# Patient Record
Sex: FEMALE | Race: WHITE | NOT HISPANIC OR LATINO | Employment: STUDENT | URBAN - NONMETROPOLITAN AREA
[De-identification: names, ages, dates, MRNs, and addresses within clinical notes are randomized per-mention and may not be internally consistent; named-entity substitution may affect disease eponyms.]

---

## 2024-09-03 ENCOUNTER — OFFICE VISIT (OUTPATIENT)
Dept: INTERNAL MEDICINE | Facility: CLINIC | Age: 19
End: 2024-09-03
Payer: COMMERCIAL

## 2024-09-03 VITALS
HEART RATE: 80 BPM | BODY MASS INDEX: 35.05 KG/M2 | HEIGHT: 70 IN | SYSTOLIC BLOOD PRESSURE: 137 MMHG | WEIGHT: 244.8 LBS | TEMPERATURE: 98.4 F | DIASTOLIC BLOOD PRESSURE: 81 MMHG | OXYGEN SATURATION: 99 %

## 2024-09-03 DIAGNOSIS — Z11.59 ENCOUNTER FOR HEPATITIS C SCREENING TEST FOR LOW RISK PATIENT: ICD-10-CM

## 2024-09-03 DIAGNOSIS — Z86.69 HISTORY OF TETHERED SPINAL CORD: ICD-10-CM

## 2024-09-03 DIAGNOSIS — Z00.00 ANNUAL PHYSICAL EXAM: ICD-10-CM

## 2024-09-03 DIAGNOSIS — E06.3 HYPOTHYROIDISM DUE TO HASHIMOTO'S THYROIDITIS: ICD-10-CM

## 2024-09-03 DIAGNOSIS — E03.8 HYPOTHYROIDISM DUE TO HASHIMOTO'S THYROIDITIS: ICD-10-CM

## 2024-09-03 DIAGNOSIS — G43.E19 INTRACTABLE CHRONIC MIGRAINE WITH AURA AND WITHOUT STATUS MIGRAINOSUS: ICD-10-CM

## 2024-09-03 DIAGNOSIS — N39.46 MIXED STRESS AND URGE URINARY INCONTINENCE: ICD-10-CM

## 2024-09-03 DIAGNOSIS — G91.9 HYDROCEPHALUS, UNSPECIFIED TYPE: ICD-10-CM

## 2024-09-03 DIAGNOSIS — Z87.74 HISTORY OF TETRALOGY OF FALLOT: Primary | ICD-10-CM

## 2024-09-03 PROCEDURE — 99385 PREV VISIT NEW AGE 18-39: CPT | Performed by: FAMILY MEDICINE

## 2024-09-03 RX ORDER — ATOGEPANT 10 MG/1
10 TABLET ORAL DAILY
Qty: 30 TABLET | Refills: 11 | Status: SHIPPED | OUTPATIENT
Start: 2024-09-03

## 2024-09-03 RX ORDER — CYCLOBENZAPRINE HCL 10 MG
10 TABLET ORAL 3 TIMES DAILY PRN
Qty: 90 TABLET | Refills: 11 | Status: SHIPPED | OUTPATIENT
Start: 2024-09-03

## 2024-09-03 RX ORDER — BACLOFEN 10 MG/1
10 TABLET ORAL 3 TIMES DAILY
COMMUNITY
End: 2024-09-03

## 2024-09-03 RX ORDER — TOPIRAMATE 50 MG/1
50 TABLET, FILM COATED ORAL 2 TIMES DAILY
Qty: 60 TABLET | Refills: 11 | Status: SHIPPED | OUTPATIENT
Start: 2024-09-03

## 2024-09-03 RX ORDER — DULOXETIN HYDROCHLORIDE 30 MG/1
30 CAPSULE, DELAYED RELEASE ORAL DAILY
COMMUNITY
Start: 2024-08-18

## 2024-09-03 RX ORDER — PRUCALOPRIDE 2 MG/1
1 TABLET, FILM COATED ORAL DAILY
COMMUNITY
Start: 2024-07-28 | End: 2024-09-03 | Stop reason: SDUPTHER

## 2024-09-03 RX ORDER — IBUPROFEN 600 MG/1
600 TABLET, FILM COATED ORAL EVERY 6 HOURS PRN
COMMUNITY
Start: 2024-07-02 | End: 2024-09-03 | Stop reason: SDUPTHER

## 2024-09-03 RX ORDER — MULTIVIT-MIN/IRON/FOLIC ACID/K 18-600-40
CAPSULE ORAL DAILY
COMMUNITY

## 2024-09-03 RX ORDER — PRUCALOPRIDE 2 MG/1
1 TABLET, FILM COATED ORAL DAILY
Qty: 30 TABLET | Refills: 11 | Status: SHIPPED | OUTPATIENT
Start: 2024-09-03

## 2024-09-03 RX ORDER — OXYBUTYNIN CHLORIDE 10 MG/1
10 TABLET, EXTENDED RELEASE ORAL DAILY
Qty: 30 TABLET | Refills: 11 | Status: SHIPPED | OUTPATIENT
Start: 2024-09-03

## 2024-09-03 RX ORDER — NORETHINDRONE ACETATE AND ETHINYL ESTRADIOL 1MG-20(21)
1 KIT ORAL DAILY
COMMUNITY

## 2024-09-03 RX ORDER — OXYBUTYNIN CHLORIDE 10 MG/1
1 TABLET, EXTENDED RELEASE ORAL DAILY
COMMUNITY
End: 2024-09-03 | Stop reason: SDUPTHER

## 2024-09-03 RX ORDER — IBUPROFEN 800 MG/1
800 TABLET, FILM COATED ORAL EVERY 6 HOURS PRN
Qty: 90 TABLET | Refills: 3 | Status: SHIPPED | OUTPATIENT
Start: 2024-09-03

## 2024-09-03 RX ORDER — TOPIRAMATE 50 MG/1
50 TABLET, FILM COATED ORAL 2 TIMES DAILY
COMMUNITY
End: 2024-09-03 | Stop reason: SDUPTHER

## 2024-09-03 NOTE — PROGRESS NOTES
Subjective     Chief Complaint   Patient presents with    Headache     Establish       History of Present Illness    Patient's PMR from outside medical facility reviewed and noted.    Ellen Gusman is a 19 y.o. female who presents to establish a new Primary care physician.  she presents for evaluation of headache. Symptoms began about several  years  ago. Generally, the headaches last about several days and occur continuously. The headaches do not seem to be related to any time of the day. The headaches are usually moderate, dull, pounding, sharp, squeezing, and throbbing and are located in frontal and occipital.  The patient rates her most severe headaches a 7 on a scale from 1 to 10. Recently, the headaches have been increasing in frequency. Work attendance or other daily activities are affected by the headaches. Precipitating factors include: weather changes. The headaches are usually preceded by an aura consisting of numbness in cheek . Associated neurologic symptoms: worsening school/work performance. The patient denies dizziness, loss of balance, and muscle weakness. Home treatment has included  amovig, ajovy, topomax  with some improvement. Other history includes: nothing pertinent. Family history includes no known family members with significant headaches.  Start the patient on some Qulipta and see if this is effective for this patient she would also like to get a referral to neurology.  Patient had prior primarily been diagnosed with hydrocephalus however they had decided not to do surgery as her pressures were lower when she got on the operating table to have it done she would like to have this rechecked by neurology at this time.    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  Patient has tried and failed parastine treatments, but has not been on other medication due to a prior bowl perforation.  Discussed with the patient that we may need  to try some alternatives and she understands this going forward    Due to prior history of tethered spinal cord patient also has some urinary incontinence she would like to go ahead and get set up with urology at this time.  She states that she does do very well with her current medication but has been advised that she may need to cath in the future    Patient also states that due to her history of tetralogy of Fallot that she would like to get set up with a cardiologist to follow.  She states that she does fine with her current medications and did have a workup before she left [place] that came back looking fairly good for her.    Has a history of Hashimoto's and would like to recheck her thyroid levels as well as her routine labs at this time.  Would also like to get her hepatitis C screening done today.    I discussed at preventative health care and cancer screening with her and its role in reducing types of cancer and other health problems appropriate for the patient's age.  Also discussed vaccines and current status with the patient.  Patient understands the risks and benefits of screening and is currently up-to-date with current recommendations that she chosses.        Past Medical History:   Past Medical History:   Diagnosis Date    GERD (gastroesophageal reflux disease)     Headache     Heart murmur     History of medical problems     CHD (tetralogy of Fallot), Tether cord, Rich-Danlos, Dysautonomia    Low back pain      Past Surgical History:  Past Surgical History:   Procedure Laterality Date    CARDIAC CATHETERIZATION  2013    CARDIAC SURGERY  2004,2016    Trilogy of Fallot  repair and pulmonary valve replacement    CARDIAC VALVE REPLACEMENT      COLONOSCOPY  2014    Colonoscopy That led to Bowel perforation    SPINE SURGERY  2019,2019    The cord release and revision     Social History:  reports that she has never smoked. She has never used smokeless tobacco. She reports that she does not drink alcohol  and does not use drugs.    Family History: family history includes Anxiety disorder in her mother and sister; Cancer in her maternal grandmother; Depression in her paternal grandfather; Hyperlipidemia in her father; Mental illness in her paternal grandfather; Other in her mother; Thyroid disease in her mother; Vision loss in her mother.      Allergies:  Allergies   Allergen Reactions    Sulfamethoxazole-Trimethoprim Anaphylaxis    Gluten Meal Other (See Comments)     Abd pain, chest pain      Medications:  Prior to Admission medications    Medication Sig Start Date End Date Taking? Authorizing Provider   Ascorbic Acid (Vitamin C) 500 MG capsule Daily.   Yes Bert Mcknight MD   Aspirin 81 MG capsule Take 81 mg by mouth Daily.   Yes Bert Mcknight MD   aspirin-acetaminophen-caffeine (EXCEDRIN MIGRAINE) 250-250-65 MG per tablet Take 1 tablet by mouth Every 6 (Six) Hours As Needed.   Yes Bert Mcknight MD   baclofen (LIORESAL) 10 MG tablet Take 1 tablet by mouth 3 (Three) Times a Day.   Yes Bert Mcknight MD   Blisovi FE 1/20 1-20 MG-MCG per tablet Take 1 tablet by mouth Daily.   Yes Bert Mcknight MD   Cholecalciferol (D3-1000 PO) Daily.   Yes Bert Mcknight MD   DULoxetine (CYMBALTA) 30 MG capsule Take 1 capsule by mouth Daily. 8/18/24  Yes Bert Mcknight MD   ibuprofen (ADVIL,MOTRIN) 600 MG tablet Take 1 tablet by mouth Every 6 (Six) Hours As Needed. for pain 7/2/24  Yes Bert Mcknight MD   Motegrity 2 MG tablet Take 1 tablet by mouth Daily. 7/28/24  Yes Bert Mcknight MD   oxybutynin XL (DITROPAN-XL) 10 MG 24 hr tablet Take 1 tablet by mouth Daily.   Yes Bret Mcknight MD   topiramate (TOPAMAX) 50 MG tablet Take 1 tablet by mouth 2 (Two) Times a Day.   Yes Bert Mcknight MD       JAIMEE: Over the last two weeks, how often have you been bothered by the following problems?  Feeling nervous, anxious or on edge: Not at all  Not being able to stop  "or control worrying: Not at all  Worrying too much about different things: Not at all  Trouble Relaxing: Not at all  Being so restless that it is hard to sit still: Not at all  Becoming easily annoyed or irritable: Not at all  Feeling afraid as if something awful might happen: Not at all  JAIMEE 7 Total Score: 0  If you checked any problems, how difficult have these problems made it for you to do your work, take care of things at home, or get along with other people: Not difficult at all      PHQ-9 Depression Screening  Little interest or pleasure in doing things? 0-->not at all   Feeling down, depressed, or hopeless? 0-->not at all   Trouble falling or staying asleep, or sleeping too much?     Feeling tired or having little energy?     Poor appetite or overeating?     Feeling bad about yourself - or that you are a failure or have let yourself or your family down?     Trouble concentrating on things, such as reading the newspaper or watching television?     Moving or speaking so slowly that other people could have noticed? Or the opposite - being so fidgety or restless that you have been moving around a lot more than usual?     Thoughts that you would be better off dead, or of hurting yourself in some way?     PHQ-9 Total Score 0   If you checked off any problems, how difficult have these problems made it for you to do your work, take care of things at home, or get along with other people?         PHQ-9 Total Score: 0   0 (Negative screening for depression)  Support given, observe for worsening symptoms    Review of systems   negative unless otherwise specified above in HPI    Objective     Vital Signs: /81 (BP Location: Left arm, Patient Position: Sitting, Cuff Size: Adult)   Pulse 80   Temp 98.4 °F (36.9 °C) (Infrared)   Ht 177.8 cm (70\")   Wt 111 kg (244 lb 12.8 oz)   SpO2 99%   BMI 35.13 kg/m²     Physical Exam  Vitals and nursing note reviewed.   Constitutional:       General: She is not in acute " "distress.     Appearance: Normal appearance.   HENT:      Head: Normocephalic.   Eyes:      Extraocular Movements: Extraocular movements intact.      Pupils: Pupils are equal, round, and reactive to light.   Cardiovascular:      Rate and Rhythm: Normal rate and regular rhythm.      Heart sounds: Normal heart sounds. No murmur heard.  Pulmonary:      Effort: Pulmonary effort is normal. No respiratory distress.      Breath sounds: Normal breath sounds. No rhonchi or rales.   Abdominal:      General: Abdomen is flat. Bowel sounds are normal.      Palpations: Abdomen is soft.   Neurological:      General: No focal deficit present.      Mental Status: She is alert.         Class 2 Severe Obesity (BMI >=35 and <=39.9). Obesity-related health conditions include the following: hypertension. Obesity is newly identified. BMI is is above average; BMI management plan is completed. We discussed portion control and increasing exercise.      Results Reviewed:  No results found for: \"GLUCOSE\", \"BUN\", \"CREATININE\", \"NA\", \"K\", \"CL\", \"CO2\", \"CALCIUM\", \"ALT\", \"AST\", \"WBC\", \"HCT\", \"PLT\", \"CHOL\", \"TRIG\", \"HDL\", \"LDL\", \"LDLHDL\", \"HGBA1C\"          Procedure   Procedures       Assessment / Plan     Assessment/Plan:   Diagnosis Plan   1. History of tetralogy of Fallot  Ambulatory Referral to Cardiology      2. Hydrocephalus, unspecified type  Ambulatory Referral to Neurology      3. Intractable chronic migraine with aura and without status migrainosus  Atogepant (Qulipta) 10 MG tablet    cyclobenzaprine (FLEXERIL) 10 MG tablet    ibuprofen (ADVIL,MOTRIN) 800 MG tablet    topiramate (TOPAMAX) 50 MG tablet      4. History of tethered spinal cord  Motegrity 2 MG tablet    oxybutynin XL (DITROPAN-XL) 10 MG 24 hr tablet      5. Encounter for hepatitis C screening test for low risk patient  Hepatitis C Antibody      6. Annual physical exam  CBC & Differential    Comprehensive Metabolic Panel    Lipid Panel      7. Mixed stress and urge urinary " incontinence  oxybutynin XL (DITROPAN-XL) 10 MG 24 hr tablet    Ambulatory Referral to Urology      8. Hypothyroidism due to Hashimoto's thyroiditis  TSH            Return in about 4 weeks (around 10/1/2024) for Recheck. unless patient needs to be seen sooner or acute issues arise.      I have discussed the patient results/orders and and plan/recommendation with them at today's visit.      Signed by:    Keon Johns MD Date: 09/03/24

## 2024-09-04 ENCOUNTER — TELEPHONE (OUTPATIENT)
Dept: INTERNAL MEDICINE | Facility: CLINIC | Age: 19
End: 2024-09-04
Payer: COMMERCIAL

## 2024-09-04 DIAGNOSIS — K59.2 CONSTIPATION DUE TO NEUROGENIC BOWEL: Primary | ICD-10-CM

## 2024-09-04 DIAGNOSIS — K59.00 CONSTIPATION DUE TO NEUROGENIC BOWEL: Primary | ICD-10-CM

## 2024-09-04 RX ORDER — LUBIPROSTONE 24 UG/1
24 CAPSULE ORAL 2 TIMES DAILY WITH MEALS
Qty: 60 CAPSULE | Refills: 11 | Status: SHIPPED | OUTPATIENT
Start: 2024-09-04

## 2024-09-04 NOTE — TELEPHONE ENCOUNTER
Sent Amitiza as an alternative patient was advised of this possibility while in the office let her know of the change in that we may be go back to the Motegrity in the future

## 2024-09-05 LAB
ALBUMIN SERPL-MCNC: 4.9 G/DL (ref 4–5)
ALP SERPL-CCNC: 81 IU/L (ref 42–106)
ALT SERPL-CCNC: 24 IU/L (ref 0–32)
AST SERPL-CCNC: 18 IU/L (ref 0–40)
BASOPHILS # BLD AUTO: 0.1 X10E3/UL (ref 0–0.2)
BASOPHILS NFR BLD AUTO: 1 %
BILIRUB SERPL-MCNC: 0.2 MG/DL (ref 0–1.2)
BUN SERPL-MCNC: 12 MG/DL (ref 6–20)
BUN/CREAT SERPL: 14 (ref 9–23)
CALCIUM SERPL-MCNC: 10.1 MG/DL (ref 8.7–10.2)
CHLORIDE SERPL-SCNC: 103 MMOL/L (ref 96–106)
CHOLEST SERPL-MCNC: 245 MG/DL (ref 100–199)
CO2 SERPL-SCNC: 18 MMOL/L (ref 20–29)
CREAT SERPL-MCNC: 0.83 MG/DL (ref 0.57–1)
EGFRCR SERPLBLD CKD-EPI 2021: 103 ML/MIN/1.73
EOSINOPHIL # BLD AUTO: 0.1 X10E3/UL (ref 0–0.4)
EOSINOPHIL NFR BLD AUTO: 1 %
ERYTHROCYTE [DISTWIDTH] IN BLOOD BY AUTOMATED COUNT: 12.2 % (ref 11.7–15.4)
GLOBULIN SER CALC-MCNC: 2.9 G/DL (ref 1.5–4.5)
GLUCOSE SERPL-MCNC: 84 MG/DL (ref 70–99)
HCT VFR BLD AUTO: 46 % (ref 34–46.6)
HCV IGG SERPL QL IA: NON REACTIVE
HDLC SERPL-MCNC: 49 MG/DL
HGB BLD-MCNC: 14.6 G/DL (ref 11.1–15.9)
IMM GRANULOCYTES # BLD AUTO: 0 X10E3/UL (ref 0–0.1)
IMM GRANULOCYTES NFR BLD AUTO: 0 %
LDLC SERPL CALC-MCNC: 167 MG/DL (ref 0–99)
LYMPHOCYTES # BLD AUTO: 1.7 X10E3/UL (ref 0.7–3.1)
LYMPHOCYTES NFR BLD AUTO: 28 %
MCH RBC QN AUTO: 29.1 PG (ref 26.6–33)
MCHC RBC AUTO-ENTMCNC: 31.7 G/DL (ref 31.5–35.7)
MCV RBC AUTO: 92 FL (ref 79–97)
MONOCYTES # BLD AUTO: 0.4 X10E3/UL (ref 0.1–0.9)
MONOCYTES NFR BLD AUTO: 6 %
NEUTROPHILS # BLD AUTO: 4 X10E3/UL (ref 1.4–7)
NEUTROPHILS NFR BLD AUTO: 64 %
PLATELET # BLD AUTO: 260 X10E3/UL (ref 150–450)
POTASSIUM SERPL-SCNC: 4.3 MMOL/L (ref 3.5–5.2)
PROT SERPL-MCNC: 7.8 G/DL (ref 6–8.5)
RBC # BLD AUTO: 5.02 X10E6/UL (ref 3.77–5.28)
SODIUM SERPL-SCNC: 136 MMOL/L (ref 134–144)
TRIGL SERPL-MCNC: 161 MG/DL (ref 0–149)
TSH SERPL DL<=0.005 MIU/L-ACNC: 0.96 UIU/ML (ref 0.45–4.5)
VLDLC SERPL CALC-MCNC: 29 MG/DL (ref 5–40)
WBC # BLD AUTO: 6.3 X10E3/UL (ref 3.4–10.8)

## 2024-09-25 ENCOUNTER — OFFICE VISIT (OUTPATIENT)
Dept: CARDIOLOGY | Facility: CLINIC | Age: 19
End: 2024-09-25
Payer: COMMERCIAL

## 2024-09-25 VITALS
WEIGHT: 244 LBS | SYSTOLIC BLOOD PRESSURE: 118 MMHG | HEIGHT: 70 IN | BODY MASS INDEX: 34.93 KG/M2 | DIASTOLIC BLOOD PRESSURE: 81 MMHG | HEART RATE: 88 BPM

## 2024-09-25 DIAGNOSIS — Z87.74 HISTORY OF TETRALOGY OF FALLOT: ICD-10-CM

## 2024-09-25 DIAGNOSIS — R42 DIZZINESS: ICD-10-CM

## 2024-09-25 DIAGNOSIS — R07.89 OTHER CHEST PAIN: ICD-10-CM

## 2024-09-25 DIAGNOSIS — Q21.3 TETRALOGY OF FALLOT: Primary | ICD-10-CM

## 2024-09-25 PROCEDURE — 99204 OFFICE O/P NEW MOD 45 MIN: CPT | Performed by: EMERGENCY MEDICINE

## 2024-09-27 ENCOUNTER — OFFICE VISIT (OUTPATIENT)
Dept: UROLOGY | Facility: CLINIC | Age: 19
End: 2024-09-27
Payer: COMMERCIAL

## 2024-09-27 VITALS — BODY MASS INDEX: 35.1 KG/M2 | HEIGHT: 70 IN | WEIGHT: 245.2 LBS | TEMPERATURE: 97.6 F

## 2024-09-27 DIAGNOSIS — N39.46 MIXED STRESS AND URGE URINARY INCONTINENCE: Primary | ICD-10-CM

## 2024-09-27 LAB
BILIRUB BLD-MCNC: ABNORMAL MG/DL
CLARITY, POC: CLEAR
COLOR UR: YELLOW
GLUCOSE UR STRIP-MCNC: NEGATIVE MG/DL
KETONES UR QL: NEGATIVE
LEUKOCYTE EST, POC: ABNORMAL
NITRITE UR-MCNC: NEGATIVE MG/ML
PH UR: 5.5 [PH] (ref 5–8)
PROT UR STRIP-MCNC: ABNORMAL MG/DL
RBC # UR STRIP: NEGATIVE /UL
SP GR UR: 1.03 (ref 1–1.03)
UROBILINOGEN UR QL: ABNORMAL

## 2024-09-27 RX ORDER — TOLTERODINE 4 MG/1
4 CAPSULE, EXTENDED RELEASE ORAL DAILY
Qty: 30 CAPSULE | Refills: 11 | Status: SHIPPED | OUTPATIENT
Start: 2024-09-27

## 2024-09-29 PROBLEM — R07.89 OTHER CHEST PAIN: Status: ACTIVE | Noted: 2024-09-29

## 2024-09-29 PROBLEM — R42 DIZZINESS: Status: ACTIVE | Noted: 2024-09-29

## 2024-10-07 ENCOUNTER — OFFICE VISIT (OUTPATIENT)
Dept: INTERNAL MEDICINE | Facility: CLINIC | Age: 19
End: 2024-10-07
Payer: COMMERCIAL

## 2024-10-07 VITALS
WEIGHT: 244 LBS | HEART RATE: 83 BPM | OXYGEN SATURATION: 98 % | BODY MASS INDEX: 34.93 KG/M2 | HEIGHT: 70 IN | DIASTOLIC BLOOD PRESSURE: 82 MMHG | TEMPERATURE: 98.5 F | SYSTOLIC BLOOD PRESSURE: 126 MMHG

## 2024-10-07 DIAGNOSIS — Z23 NEED FOR INFLUENZA VACCINATION: ICD-10-CM

## 2024-10-07 DIAGNOSIS — K59.00 CONSTIPATION DUE TO NEUROGENIC BOWEL: ICD-10-CM

## 2024-10-07 DIAGNOSIS — K59.2 CONSTIPATION DUE TO NEUROGENIC BOWEL: ICD-10-CM

## 2024-10-07 DIAGNOSIS — Z86.69 HISTORY OF TETHERED SPINAL CORD: ICD-10-CM

## 2024-10-07 DIAGNOSIS — E78.2 MIXED HYPERLIPIDEMIA: ICD-10-CM

## 2024-10-07 DIAGNOSIS — R13.10 DIFFICULTY SWALLOWING SOLIDS: Primary | ICD-10-CM

## 2024-10-07 PROBLEM — K92.89 GASTROINTESTINAL DYSMOTILITY: Status: ACTIVE | Noted: 2021-05-24

## 2024-10-07 PROBLEM — R42 DIZZINESS: Status: RESOLVED | Noted: 2024-09-29 | Resolved: 2024-10-07

## 2024-10-07 PROBLEM — G93.2 IIH (IDIOPATHIC INTRACRANIAL HYPERTENSION): Chronic | Status: ACTIVE | Noted: 2022-04-28

## 2024-10-07 PROBLEM — Z87.74: Status: ACTIVE | Noted: 2023-12-24

## 2024-10-07 PROBLEM — G47.33 OSA (OBSTRUCTIVE SLEEP APNEA): Status: ACTIVE | Noted: 2024-04-24

## 2024-10-07 PROBLEM — I77.4 CELIAC ARTERY STENOSIS: Status: ACTIVE | Noted: 2018-06-25

## 2024-10-07 PROBLEM — K21.9 GERD (GASTROESOPHAGEAL REFLUX DISEASE): Status: ACTIVE | Noted: 2018-06-25

## 2024-10-07 PROBLEM — E66.9 OBESITY (BMI 30-39.9): Chronic | Status: ACTIVE | Noted: 2022-04-28

## 2024-10-07 PROBLEM — R60.0 LOWER EXTREMITY EDEMA: Status: ACTIVE | Noted: 2018-06-25

## 2024-10-07 PROBLEM — Z95.2: Status: ACTIVE | Noted: 2023-12-24

## 2024-10-07 PROBLEM — J45.909 ASTHMA: Status: ACTIVE | Noted: 2018-06-25

## 2024-10-07 PROBLEM — Q79.60 EHLERS-DANLOS SYNDROME: Status: ACTIVE | Noted: 2022-02-10

## 2024-10-07 PROBLEM — M79.2 PERIPHERAL NEUROPATHIC PAIN: Status: ACTIVE | Noted: 2018-11-20

## 2024-10-07 PROBLEM — Q06.8 OTHER SPECIFIED CONGENITAL MALFORMATIONS OF SPINAL CORD: Status: ACTIVE | Noted: 2019-01-12

## 2024-10-07 PROBLEM — D89.40 MAST CELL ACTIVATION SYNDROME: Status: ACTIVE | Noted: 2020-05-12

## 2024-10-07 PROBLEM — L85.8 KERATOSIS PILARIS: Status: ACTIVE | Noted: 2018-06-25

## 2024-10-07 PROBLEM — Q21.12 PFO (PATENT FORAMEN OVALE): Status: ACTIVE | Noted: 2023-12-25

## 2024-10-07 PROBLEM — F41.9 ANXIETY AND DEPRESSION: Chronic | Status: ACTIVE | Noted: 2022-04-28

## 2024-10-07 PROBLEM — F32.A ANXIETY AND DEPRESSION: Chronic | Status: ACTIVE | Noted: 2022-04-28

## 2024-10-07 PROCEDURE — 90656 IIV3 VACC NO PRSV 0.5 ML IM: CPT | Performed by: FAMILY MEDICINE

## 2024-10-07 PROCEDURE — 90471 IMMUNIZATION ADMIN: CPT | Performed by: FAMILY MEDICINE

## 2024-10-07 PROCEDURE — 99214 OFFICE O/P EST MOD 30 MIN: CPT | Performed by: FAMILY MEDICINE

## 2024-10-07 RX ORDER — ROSUVASTATIN CALCIUM 10 MG/1
10 TABLET, COATED ORAL DAILY
Qty: 30 TABLET | Refills: 11 | Status: SHIPPED | OUTPATIENT
Start: 2024-10-07

## 2024-10-07 NOTE — PROGRESS NOTES
Subjective     Chief Complaint   Patient presents with   • Headache       Extremity Pain       Patient's PMR from outside medical facility reviewed and noted.    Ellen Gusman is a 19 y.o. female who presents for a routine office visit.  Patient comes in stating that she is having difficulty swallowing solids on a regular basis.  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  Patient also did not do well with the Amitiza instead of her Motegrity but will follow-up with GI over this and she will be following with them.    Patient also states that she did not do well on Flexeril she states that she felt heart palpitations when she took it would like to try different muscle relaxer at this time.    Patient just started her Qulipta she is not sure how well it is working for her yet.    I saw her last she has followed up with cardiology and will be obtaining a stress test she will develop also be following up with a specialist at [facility] for her congenital coronary artery disease.  Patient has also seen pulmonology since we saw her last.    Discussed the patient's mixed hyperlipidemia due to her history of coronary artery disease we will go ahead and start her on some Crestor along with diet follow-up with her in 4 months and recheck her labs.    Patient would also like to go ahead and get her flu shot today.        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   • Hyperlipidemia     Working on it   • Low back pain    • Sleep apnea 2023    Use Cpap   • Urinary tract infection [facility]    Missed Tethered Cord until age 13     Past Surgical History:  Past Surgical History:   Procedure Laterality Date   • CARDIAC CATHETERIZATION  2013   • CARDIAC SURGERY  2004,2016    Trilogy of Fallot  repair and pulmonary valve replacement   • CARDIAC VALVE REPLACEMENT     • COLON SURGERY  2013    Perforation during testing   • COLONOSCOPY  2014    Colonoscopy That led to Bowel perforation   • CORONARY ARTERY BYPASS GRAFT  2004,2016    Trilogy of Fallot   • SPINE SURGERY  2019,2019    The cord release and revision     Social History:  reports that she has never smoked. She has never been exposed to tobacco smoke. She has never used smokeless tobacco. She reports that she does not drink alcohol and does not use drugs.    Family History: family history includes Anxiety disorder in her mother and sister; Cancer in her maternal grandmother; Depression in her paternal grandfather; Hyperlipidemia in her father; Mental illness in her paternal grandfather; Other in her mother; Thyroid disease in her mother; Vision loss in her mother.      Allergies:  Allergies   Allergen Reactions   • Sulfamethoxazole-Trimethoprim Anaphylaxis   • Gluten Meal Other (See Comments)     Abd pain, chest pain      Medications:  Prior to Admission medications    Medication Sig Start Date End Date Taking? Authorizing Provider   Ascorbic Acid (Vitamin C) 500 MG capsule Daily.   Yes Bert Mcknight MD   Aspirin 81 MG capsule Take 81 mg by mouth Daily.   Yes Bert Mcknight MD   aspirin-acetaminophen-caffeine (EXCEDRIN MIGRAINE) 250-250-65 MG per tablet Take 1 tablet by mouth Every 6 (Six) Hours As Needed.   Yes Bert Mcknight MD    baclofen (LIORESAL) 10 MG tablet Take 1 tablet by mouth 3 (Three) Times a Day.   Yes Bert Mcknight MD   Blisovi FE 1/20 1-20 MG-MCG per tablet Take 1 tablet by mouth Daily.   Yes Bert Mcknight MD   Cholecalciferol (D3-1000 PO) Daily.   Yes Bert Mcknight MD   DULoxetine (CYMBALTA) 30 MG capsule Take 1 capsule by mouth Daily. 8/18/24  Yes Bert Mcknight MD   ibuprofen (ADVIL,MOTRIN) 600 MG tablet Take 1 tablet by mouth Every 6 (Six) Hours As Needed. for pain 7/2/24  Yes Bert Mcknight MD   Motegrity 2 MG tablet Take 1 tablet by mouth Daily. 7/28/24  Yes Bert Mcknight MD   oxybutynin XL (DITROPAN-XL) 10 MG 24 hr tablet Take 1 tablet by mouth Daily.   Yes Bert Mcknight MD   topiramate (TOPAMAX) 50 MG tablet Take 1 tablet by mouth 2 (Two) Times a Day.   Yes Bert Mcknight MD       JAIMEE:        PHQ-9 Depression Screening  Little interest or pleasure in doing things? 0-->not at all   Feeling down, depressed, or hopeless? 0-->not at all   Trouble falling or staying asleep, or sleeping too much?     Feeling tired or having little energy?     Poor appetite or overeating?     Feeling bad about yourself - or that you are a failure or have let yourself or your family down?     Trouble concentrating on things, such as reading the newspaper or watching television?     Moving or speaking so slowly that other people could have noticed? Or the opposite - being so fidgety or restless that you have been moving around a lot more than usual?     Thoughts that you would be better off dead, or of hurting yourself in some way?     PHQ-9 Total Score 0   If you checked off any problems, how difficult have these problems made it for you to do your work, take care of things at home, or get along with other people?         PHQ-9 Total Score: 0   0 (Negative screening for depression)  Support given, observe for worsening symptoms    Review of systems   negative unless otherwise  "specified above in HPI    Objective     Vital Signs: /82 (BP Location: Right arm, Patient Position: Sitting, Cuff Size: Adult)   Pulse 83   Temp 98.5 °F (36.9 °C) (Infrared)   Ht 177.8 cm (70\")   Wt 111 kg (244 lb)   SpO2 98%   BMI 35.01 kg/m²     Physical Exam  Vitals and nursing note reviewed.   Constitutional:       General: She is not in acute distress.     Appearance: Normal appearance.   HENT:      Head: Normocephalic.   Eyes:      Extraocular Movements: Extraocular movements intact.      Pupils: Pupils are equal, round, and reactive to light.   Cardiovascular:      Rate and Rhythm: Normal rate and regular rhythm.      Heart sounds: Normal heart sounds. No murmur heard.  Pulmonary:      Effort: Pulmonary effort is normal. No respiratory distress.      Breath sounds: Normal breath sounds. No rhonchi or rales.   Abdominal:      General: Abdomen is flat. Bowel sounds are normal.      Palpations: Abdomen is soft.   Neurological:      General: No focal deficit present.      Mental Status: She is alert.       Class 2 Severe Obesity (BMI >=35 and <=39.9). Obesity-related health conditions include the following: hypertension. Obesity is newly identified. BMI is is above average; BMI management plan is completed. We discussed portion control and increasing exercise.      Results Reviewed:  Glucose   Date Value Ref Range Status   09/04/2024 84 70 - 99 mg/dL Final     BUN   Date Value Ref Range Status   09/04/2024 12 6 - 20 mg/dL Final   04/30/2021 11 7 - 25 mg/dL Final     Creatinine   Date Value Ref Range Status   09/04/2024 0.83 0.57 - 1.00 mg/dL Final   04/30/2021 0.60 0.60 - 1.20 mg/dL Final     Sodium   Date Value Ref Range Status   09/04/2024 136 134 - 144 mmol/L Final   04/30/2021 138 136 - 145 mmol/L Final     Potassium   Date Value Ref Range Status   09/04/2024 4.3 3.5 - 5.2 mmol/L Final   04/30/2021 5.3 (H) 3.5 - 5.1 mmol/L Final     Chloride   Date Value Ref Range Status   09/04/2024 103 96 - 106 " mmol/L Final   04/30/2021 105 98 - 107 mmol/L Final     CO2   Date Value Ref Range Status   04/30/2021 24 21 - 31 mmol/L Final     Total CO2   Date Value Ref Range Status   09/04/2024 18 (L) 20 - 29 mmol/L Final     Calcium   Date Value Ref Range Status   09/04/2024 10.1 8.7 - 10.2 mg/dL Final   04/30/2021 10.1 8.6 - 10.3 mg/dL Final     ALT (SGPT)   Date Value Ref Range Status   09/04/2024 24 0 - 32 IU/L Final   04/30/2021 39 7 - 52 U/L Final     AST (SGOT)   Date Value Ref Range Status   09/04/2024 18 0 - 40 IU/L Final   04/30/2021 42 (H) 13 - 39 U/L Final     WBC   Date Value Ref Range Status   09/04/2024 6.3 3.4 - 10.8 x10E3/uL Final     Hematocrit   Date Value Ref Range Status   09/04/2024 46.0 34.0 - 46.6 % Final     Platelets   Date Value Ref Range Status   09/04/2024 260 150 - 450 x10E3/uL Final     Triglycerides   Date Value Ref Range Status   09/04/2024 161 (H) 0 - 149 mg/dL Final     HDL Cholesterol   Date Value Ref Range Status   09/04/2024 49 >39 mg/dL Final     LDL Chol Calc (NIH)   Date Value Ref Range Status   09/04/2024 167 (H) 0 - 99 mg/dL Final             Procedure   Procedures       Assessment / Plan     Assessment/Plan:   Diagnosis Plan   1. Difficulty swallowing solids  Ambulatory Referral to Gastroenterology      2. History of tethered spinal cord  tiZANidine (Zanaflex) 4 MG tablet      3. Constipation due to neurogenic bowel        4. Mixed hyperlipidemia  rosuvastatin (Crestor) 10 MG tablet      5. Need for influenza vaccination  Fluzone >6mos              Return in about 4 months (around 2/7/2025) for Recheck. unless patient needs to be seen sooner or acute issues arise.      I have discussed the patient results/orders and and plan/recommendation with them at today's visit.      Signed by:    Keon Johns MD Date: 10/07/24

## 2024-10-07 NOTE — LETTER
[facility]  Vaccine Consent Form    Patient Name:  Ellen Gusman  Patient :  2005     Vaccine(s) Ordered    Fluzone >6mos        Screening Checklist  The following questions should be completed prior to vaccination. If you answer “yes” to any question, it does not necessarily mean you should not be vaccinated. It just means we may need to clarify or ask more questions. If a question is unclear, please ask your healthcare provider to explain it.    Yes No   Any fever or moderate to severe illness today (mild illness and/or antibiotic treatment are not contraindications)?     Do you have a history of a serious reaction to any previous vaccinations, such as anaphylaxis, encephalopathy within 7 days, Guillain-Langford syndrome within 6 weeks, seizure?     Have you received any live vaccine(s) (e.g MMR, MEEK) or any other vaccines in the last month (to ensure duplicate doses aren't given)?     Do you have an anaphylactic allergy to latex (DTaP, DTaP-IPV, Hep A, Hep B, MenB, RV, Td, Tdap), baker’s yeast (Hep B, HPV), polysorbates (RSV, nirsevimab, PCV 20, Rotavirrus, Tdap, Shingrix), or gelatin (MEEK, MMR)?     Do you have an anaphylactic allergy to neomycin (Rabies, MEEK, MMR, IPV, Hep A), polymyxin B (IPV), or streptomycin (IPV)?      Any cancer, leukemia, AIDS, or other immune system disorder? (MEEK, MMR, RV)     Do you have a parent, brother, or sister with an immune system problem (if immune competence of vaccine recipient clinically verified, can proceed)? (MMR, MEEK)     Any recent steroid treatments for >2 weeks, chemotherapy, or radiation treatment? (MEEK, MMR)     Have you received antibody-containing blood transfusions or IVIG in the past 11 months (recommended interval is dependent on product)? (MMR, MEEK)     Have you taken antiviral drugs (acyclovir, famciclovir, valacyclovir for MEEK) in the last 24 or 48 hours, respectively?      Are you pregnant or planning to become pregnant within 1 month? (MEEK, MMR,  "HPV, IPV, MenB, Abrexvy; For Hep B- refer to Engerix-B; For RSV - Abrysvo is indicated for 32-36 weeks of pregnancy from September to January)     For infants, have you ever been told your child has had intussusception or a medical emergency involving obstruction of the intestine (Rotavirus)? If not for an infant, can skip this question.         *Ordering Physicians/APC should be consulted if \"yes\" is checked by the patient or guardian above.  I have received, read, and understand the Vaccine Information Statement (VIS) for each vaccine ordered.  I have considered my or my child's health status as well as the health status of my close contacts.  I have taken the opportunity to discuss my vaccine questions with my or my child's health care provider.   I have requested that the ordered vaccine(s) be given to me or my child.  I understand the benefits and risks of the vaccines.  I understand that I should remain in the clinic for 15 minutes after receiving the vaccine(s).  _________________________________________________________  Signature of Patient or Parent/Legal Guardian ____________________  Date     "

## 2024-10-28 ENCOUNTER — TELEPHONE (OUTPATIENT)
Dept: UROLOGY | Facility: CLINIC | Age: 19
End: 2024-10-28
Payer: COMMERCIAL

## 2024-10-28 NOTE — TELEPHONE ENCOUNTER
Patient feels that the Detrol LA is not working anymore, urgency is back, she states she is having leaking more, brain fog is worse. Patient feels its not working as good as others that she has been on in the past. She states she had a rash the other day but didn't connect it with medication but states it could be a possibility. Patient would like to know if we could switch the medication to another with no side effects. Please advise.

## 2024-10-29 NOTE — TELEPHONE ENCOUNTER
Has tried and failed now 2 different OAB meds. Please see if myrbetriq or gemtesa is approved. thanks

## 2024-10-30 ENCOUNTER — OFFICE VISIT (OUTPATIENT)
Dept: INTERNAL MEDICINE | Facility: CLINIC | Age: 19
End: 2024-10-30
Payer: COMMERCIAL

## 2024-10-30 VITALS
HEART RATE: 84 BPM | TEMPERATURE: 98.9 F | DIASTOLIC BLOOD PRESSURE: 76 MMHG | OXYGEN SATURATION: 99 % | SYSTOLIC BLOOD PRESSURE: 121 MMHG | WEIGHT: 240 LBS | BODY MASS INDEX: 34.36 KG/M2 | HEIGHT: 70 IN

## 2024-10-30 DIAGNOSIS — N39.0 URINARY TRACT INFECTION WITHOUT HEMATURIA, SITE UNSPECIFIED: Primary | ICD-10-CM

## 2024-10-30 LAB
BILIRUB BLD-MCNC: NEGATIVE MG/DL
CLARITY, POC: ABNORMAL
COLOR UR: YELLOW
GLUCOSE UR STRIP-MCNC: NEGATIVE MG/DL
KETONES UR QL: NEGATIVE
LEUKOCYTE EST, POC: ABNORMAL
NITRITE UR-MCNC: NEGATIVE MG/ML
PH UR: 7 [PH] (ref 5–8)
PROT UR STRIP-MCNC: NEGATIVE MG/DL
RBC # UR STRIP: ABNORMAL /UL
SP GR UR: 1.01 (ref 1–1.03)
UROBILINOGEN UR QL: ABNORMAL

## 2024-10-30 PROCEDURE — 99213 OFFICE O/P EST LOW 20 MIN: CPT | Performed by: FAMILY MEDICINE

## 2024-10-30 PROCEDURE — 81003 URINALYSIS AUTO W/O SCOPE: CPT | Performed by: FAMILY MEDICINE

## 2024-10-30 RX ORDER — CIPROFLOXACIN 500 MG/1
500 TABLET, FILM COATED ORAL 2 TIMES DAILY
Qty: 14 TABLET | Refills: 0 | Status: SHIPPED | OUTPATIENT
Start: 2024-10-30 | End: 2024-11-06

## 2024-10-30 RX ORDER — FLUCONAZOLE 100 MG/1
100 TABLET ORAL DAILY
Qty: 4 TABLET | Refills: 0 | Status: SHIPPED | OUTPATIENT
Start: 2024-10-30 | End: 2024-11-03

## 2024-10-30 NOTE — PROGRESS NOTES
Subjective     Chief Complaint   Patient presents with    Urinary Frequency    Urinary Tract Infection       History of Present Illness    Patient's PMR from outside medical facility reviewed and noted.    Ellen Gusman is a 19 y.o. female who presents for a routine office visit.  Patient comes in with a history of increased frequency of urination with dysuria.   Reports no flank pain, fever, nausea, vomiting associated with this at this time.  Patient reports that this has been going on for 3 days at this point, and would like something to help with her probable UTI at this time.     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    Hyperlipidemia     Working on it    Low back pain     Sleep apnea 2023    Use Cpap    Urinary tract infection [facility]    Missed Tethered Cord until age 13     Past Surgical History:  Past Surgical History:   Procedure Laterality Date    CARDIAC CATHETERIZATION  2013    CARDIAC SURGERY  2004,2016    Trilogy of Fallot  repair and pulmonary valve replacement    CARDIAC VALVE REPLACEMENT      COLON SURGERY  2013    Perforation during testing    COLONOSCOPY  2014    Colonoscopy That led to Bowel perforation    CORONARY ARTERY BYPASS GRAFT  2004,2016    Trilogy of Fallot    SPINE SURGERY  2019,2019    The cord release and revision     Social History:  reports that she has never smoked. She has never been exposed  to tobacco smoke. She has never used smokeless tobacco. She reports that she does not drink alcohol and does not use drugs.    Family History: family history includes Anxiety disorder in her mother and sister; Cancer in her maternal grandmother; Depression in her paternal grandfather; Hyperlipidemia in her father; Mental illness in her paternal grandfather; Other in her mother; Thyroid disease in her mother; Vision loss in her mother.      Allergies:  Allergies   Allergen Reactions    Sulfamethoxazole-Trimethoprim Anaphylaxis    Gluten Meal Other (See Comments)     Abd pain, chest pain      Medications:  Prior to Admission medications    Medication Sig Start Date End Date Taking? Authorizing Provider   Ascorbic Acid (Vitamin C) 500 MG capsule Daily.   Yes Bert Mcknight MD   Aspirin 81 MG capsule Take 81 mg by mouth Daily.   Yes Bert Mcknight MD   aspirin-acetaminophen-caffeine (EXCEDRIN MIGRAINE) 250-250-65 MG per tablet Take 1 tablet by mouth Every 6 (Six) Hours As Needed.   Yes Bert Mcknight MD   Atogepant (Qulipta) 10 MG tablet Take 1 tablet by mouth Daily. 9/3/24  Yes Keon Johns MD   Blisovi FE 1/20 1-20 MG-MCG per tablet Take 1 tablet by mouth Daily.   Yes Bert Mcknight MD   Cholecalciferol (D3-1000 PO) Daily.   Yes Bert Mcknight MD   DULoxetine (CYMBALTA) 30 MG capsule Take 1 capsule by mouth Daily. 8/18/24  Yes Bert Mcknight MD   ibuprofen (ADVIL,MOTRIN) 800 MG tablet Take 1 tablet by mouth Every 6 (Six) Hours As Needed for Mild Pain. for pain 9/3/24  Yes Keon Johns MD   lubiprostone (AMITIZA) 24 MCG capsule Take 1 capsule by mouth 2 (Two) Times a Day With Meals. 9/4/24  Yes Keon Johns MD   Motegrity 2 MG tablet Take 1 tablet by mouth Daily. 9/3/24  Yes Keon Johns MD   rosuvastatin (Crestor) 10 MG tablet Take 1 tablet by mouth Daily. 10/7/24  Yes Keon Johns MD   tiZANidine (Zanaflex) 4 MG  "tablet Take 1 tablet by mouth Every 8 (Eight) Hours As Needed for Muscle Spasms. 10/7/24  Yes Keon Johns MD   tolterodine LA (DETROL LA) 4 MG 24 hr capsule Take 1 capsule by mouth Daily. 9/27/24  Yes Estefani Tolentino APRN   topiramate (TOPAMAX) 50 MG tablet Take 1 tablet by mouth 2 (Two) Times a Day. 9/3/24  Yes Keon Johns MD       Review of systems   negative unless otherwise specified above in HPI    Objective     Vital Signs: /76 (BP Location: Left arm, Patient Position: Sitting, Cuff Size: Adult)   Pulse 84   Temp 98.9 °F (37.2 °C) (Infrared)   Ht 177.8 cm (70\")   Wt 109 kg (240 lb)   SpO2 99%   BMI 34.44 kg/m²     Physical Exam  Vitals and nursing note reviewed.   Constitutional:       General: She is not in acute distress.     Appearance: Normal appearance.   HENT:      Head: Normocephalic.   Eyes:      Extraocular Movements: Extraocular movements intact.      Pupils: Pupils are equal, round, and reactive to light.   Cardiovascular:      Rate and Rhythm: Normal rate and regular rhythm.      Heart sounds: Normal heart sounds. No murmur heard.  Pulmonary:      Effort: Pulmonary effort is normal. No respiratory distress.      Breath sounds: Normal breath sounds. No rhonchi or rales.   Abdominal:      General: Abdomen is flat. Bowel sounds are normal.      Palpations: Abdomen is soft.   Neurological:      General: No focal deficit present.      Mental Status: She is alert.                Results Reviewed:  Glucose   Date Value Ref Range Status   09/04/2024 84 70 - 99 mg/dL Final     BUN   Date Value Ref Range Status   09/04/2024 12 6 - 20 mg/dL Final   04/30/2021 11 7 - 25 mg/dL Final     Creatinine   Date Value Ref Range Status   09/04/2024 0.83 0.57 - 1.00 mg/dL Final   04/30/2021 0.60 0.60 - 1.20 mg/dL Final     Sodium   Date Value Ref Range Status   09/04/2024 136 134 - 144 mmol/L Final   04/30/2021 138 136 - 145 mmol/L Final     Potassium   Date Value Ref Range " Status   09/04/2024 4.3 3.5 - 5.2 mmol/L Final   04/30/2021 5.3 (H) 3.5 - 5.1 mmol/L Final     Chloride   Date Value Ref Range Status   09/04/2024 103 96 - 106 mmol/L Final   04/30/2021 105 98 - 107 mmol/L Final     CO2   Date Value Ref Range Status   04/30/2021 24 21 - 31 mmol/L Final     Total CO2   Date Value Ref Range Status   09/04/2024 18 (L) 20 - 29 mmol/L Final     Calcium   Date Value Ref Range Status   09/04/2024 10.1 8.7 - 10.2 mg/dL Final   04/30/2021 10.1 8.6 - 10.3 mg/dL Final     ALT (SGPT)   Date Value Ref Range Status   09/04/2024 24 0 - 32 IU/L Final   04/30/2021 39 7 - 52 U/L Final     AST (SGOT)   Date Value Ref Range Status   09/04/2024 18 0 - 40 IU/L Final   04/30/2021 42 (H) 13 - 39 U/L Final     WBC   Date Value Ref Range Status   09/04/2024 6.3 3.4 - 10.8 x10E3/uL Final     Hematocrit   Date Value Ref Range Status   09/04/2024 46.0 34.0 - 46.6 % Final     Platelets   Date Value Ref Range Status   09/04/2024 260 150 - 450 x10E3/uL Final     Triglycerides   Date Value Ref Range Status   09/04/2024 161 (H) 0 - 149 mg/dL Final     HDL Cholesterol   Date Value Ref Range Status   09/04/2024 49 >39 mg/dL Final     LDL Chol Calc (NIH)   Date Value Ref Range Status   09/04/2024 167 (H) 0 - 99 mg/dL Final             Procedure   Procedures       Assessment / Plan     Assessment/Plan:   Diagnosis Plan   1. Urinary tract infection without hematuria, site unspecified  POC Urinalysis Dipstick, Multipro    Urine Culture - Urine, Urine, Clean Catch    ciprofloxacin (Cipro) 500 MG tablet    fluconazole (Diflucan) 100 MG tablet            Return if symptoms worsen or fail to improve, for Recheck. unless patient needs to be seen sooner or acute issues arise.      I have discussed the patient results/orders and and plan/recommendation with them at today's visit.      Signed by:    Keon Johns MD Date: 10/30/24

## 2024-11-02 LAB
BACTERIA UR CULT: NORMAL
BACTERIA UR CULT: NORMAL

## 2024-11-07 ENCOUNTER — OFFICE VISIT (OUTPATIENT)
Dept: INTERNAL MEDICINE | Facility: CLINIC | Age: 19
End: 2024-11-07
Payer: COMMERCIAL

## 2024-11-07 VITALS
BODY MASS INDEX: 34.79 KG/M2 | WEIGHT: 243 LBS | TEMPERATURE: 98.4 F | OXYGEN SATURATION: 98 % | RESPIRATION RATE: 16 BRPM | DIASTOLIC BLOOD PRESSURE: 79 MMHG | SYSTOLIC BLOOD PRESSURE: 126 MMHG | HEART RATE: 97 BPM | HEIGHT: 70 IN

## 2024-11-07 DIAGNOSIS — R50.9 FEVER, UNSPECIFIED FEVER CAUSE: Primary | ICD-10-CM

## 2024-11-07 DIAGNOSIS — J40 BRONCHITIS: ICD-10-CM

## 2024-11-07 LAB
BILIRUB BLD-MCNC: NEGATIVE MG/DL
CLARITY, POC: ABNORMAL
COLOR UR: YELLOW
EXPIRATION DATE: NORMAL
FLUAV AG UPPER RESP QL IA.RAPID: NOT DETECTED
FLUBV AG UPPER RESP QL IA.RAPID: NOT DETECTED
GLUCOSE UR STRIP-MCNC: NEGATIVE MG/DL
INTERNAL CONTROL: NORMAL
KETONES UR QL: NEGATIVE
LEUKOCYTE EST, POC: ABNORMAL
Lab: NORMAL
NITRITE UR-MCNC: NEGATIVE MG/ML
PH UR: 6.5 [PH] (ref 5–8)
PROT UR STRIP-MCNC: NEGATIVE MG/DL
RBC # UR STRIP: NEGATIVE /UL
SARS-COV-2 AG UPPER RESP QL IA.RAPID: NOT DETECTED
SP GR UR: 1.03 (ref 1–1.03)
UROBILINOGEN UR QL: NORMAL

## 2024-11-07 PROCEDURE — 87428 SARSCOV & INF VIR A&B AG IA: CPT | Performed by: FAMILY MEDICINE

## 2024-11-07 PROCEDURE — 81003 URINALYSIS AUTO W/O SCOPE: CPT | Performed by: FAMILY MEDICINE

## 2024-11-07 PROCEDURE — 99214 OFFICE O/P EST MOD 30 MIN: CPT | Performed by: FAMILY MEDICINE

## 2024-11-07 RX ORDER — LEVOFLOXACIN 500 MG/1
500 TABLET, FILM COATED ORAL DAILY
Qty: 7 TABLET | Refills: 0 | Status: SHIPPED | OUTPATIENT
Start: 2024-11-07 | End: 2024-11-14

## 2024-11-07 RX ORDER — GUAIFENESIN 600 MG/1
600 TABLET, EXTENDED RELEASE ORAL 2 TIMES DAILY
Qty: 20 TABLET | Refills: 0 | Status: SHIPPED | OUTPATIENT
Start: 2024-11-07 | End: 2024-11-17

## 2024-11-07 RX ORDER — METHYLPREDNISOLONE 4 MG/1
TABLET ORAL
Qty: 21 TABLET | Refills: 0 | Status: SHIPPED | OUTPATIENT
Start: 2024-11-07 | End: 2024-11-21

## 2024-11-07 NOTE — LETTER
November 7, 2024     Patient: Ellen Gusman   YOB: 2005   Date of Visit: 11/7/2024       To Whom it May Concern:    Ellen Gusman was seen in my clinic on 11/7/2024. She may return to school in two days.         Sincerely,          Keon Johns MD        CC: No Recipients

## 2024-11-22 ENCOUNTER — HOSPITAL ENCOUNTER (OUTPATIENT)
Dept: CARDIOLOGY | Facility: HOSPITAL | Age: 19
Discharge: HOME | End: 2024-11-22
Payer: COMMERCIAL

## 2024-11-22 VITALS
DIASTOLIC BLOOD PRESSURE: 77 MMHG | SYSTOLIC BLOOD PRESSURE: 112 MMHG | BODY MASS INDEX: 34.72 KG/M2 | HEART RATE: 73 BPM | HEIGHT: 70 IN | WEIGHT: 242.51 LBS

## 2024-11-22 DIAGNOSIS — Q21.3 TETRALOGY OF FALLOT: ICD-10-CM

## 2024-11-22 LAB
BH CV NUCLEAR PRIOR STUDY: 3
BH CV REST NUCLEAR ISOTOPE DOSE: 10.6 MCI
BH CV STRESS BP STAGE 1: NORMAL
BH CV STRESS BP STAGE 2: NORMAL
BH CV STRESS BP STAGE 3: NORMAL
BH CV STRESS DURATION MIN STAGE 1: 3
BH CV STRESS DURATION MIN STAGE 2: 3
BH CV STRESS DURATION MIN STAGE 3: 2
BH CV STRESS DURATION SEC STAGE 1: 0
BH CV STRESS DURATION SEC STAGE 2: 0
BH CV STRESS DURATION SEC STAGE 3: 0
BH CV STRESS GRADE STAGE 1: 10
BH CV STRESS GRADE STAGE 2: 12
BH CV STRESS GRADE STAGE 3: 14
BH CV STRESS HR STAGE 1: 140
BH CV STRESS HR STAGE 2: 150
BH CV STRESS HR STAGE 3: 180
BH CV STRESS METS STAGE 1: 5
BH CV STRESS METS STAGE 2: 7.5
BH CV STRESS METS STAGE 3: 10
BH CV STRESS NUCLEAR ISOTOPE DOSE: 32.4 MCI
BH CV STRESS PROTOCOL 1: NORMAL
BH CV STRESS RECOVERY BP: NORMAL MMHG
BH CV STRESS RECOVERY HR: 100 BPM
BH CV STRESS SPEED STAGE 1: 1.7
BH CV STRESS SPEED STAGE 2: 2.5
BH CV STRESS SPEED STAGE 3: 3.4
BH CV STRESS STAGE 1: 1
BH CV STRESS STAGE 2: 2
BH CV STRESS STAGE 3: 3
MAXIMAL PREDICTED HEART RATE: 200 BPM
PERCENT MAX PREDICTED HR: 90 %
STRESS BASELINE BP: NORMAL MMHG
STRESS BASELINE HR: 75 BPM
STRESS PERCENT HR: 106 %
STRESS POST ESTIMATED WORKLOAD: 10 METS
STRESS POST EXERCISE DUR MIN: 8 MIN
STRESS POST EXERCISE DUR SEC: 0 SEC
STRESS POST PEAK BP: NORMAL MMHG
STRESS POST PEAK HR: 180 BPM
STRESS TARGET HR: 170 BPM

## 2024-11-22 PROCEDURE — 78452 HT MUSCLE IMAGE SPECT MULT: CPT

## 2024-11-22 PROCEDURE — A9502 TC99M TETROFOSMIN: HCPCS | Performed by: EMERGENCY MEDICINE

## 2024-11-22 PROCEDURE — 93017 CV STRESS TEST TRACING ONLY: CPT

## 2024-11-22 PROCEDURE — 34310000005 TECHNETIUM TETROFOSMIN KIT: Performed by: EMERGENCY MEDICINE

## 2024-11-22 RX ADMIN — TETROFOSMIN 1 DOSE: 1.38 INJECTION, POWDER, LYOPHILIZED, FOR SOLUTION INTRAVENOUS at 10:45

## 2024-11-22 RX ADMIN — TETROFOSMIN 1 DOSE: 1.38 INJECTION, POWDER, LYOPHILIZED, FOR SOLUTION INTRAVENOUS at 12:00

## 2025-01-20 ENCOUNTER — OFFICE VISIT (OUTPATIENT)
Dept: GASTROENTEROLOGY | Facility: CLINIC | Age: 20
End: 2025-01-20
Payer: COMMERCIAL

## 2025-01-20 VITALS
TEMPERATURE: 97.8 F | HEART RATE: 96 BPM | WEIGHT: 251.6 LBS | HEIGHT: 70 IN | BODY MASS INDEX: 36.02 KG/M2 | SYSTOLIC BLOOD PRESSURE: 110 MMHG | DIASTOLIC BLOOD PRESSURE: 70 MMHG | OXYGEN SATURATION: 97 %

## 2025-01-20 DIAGNOSIS — R13.19 ESOPHAGEAL DYSPHAGIA: Primary | ICD-10-CM

## 2025-01-20 PROCEDURE — 99214 OFFICE O/P EST MOD 30 MIN: CPT | Performed by: CLINICAL NURSE SPECIALIST

## 2025-01-20 NOTE — PROGRESS NOTES
Ellen Gusman  2005 1/20/2025  Chief Complaint   Patient presents with    Difficulty Swallowing     Constipation--was taking meds doing good but ins quit paying     Subjective   HPI  Ellen Gusman is a 19 y.o. female who presents with a complaint of dysphagia. She had had a trouble with medications going down at first. It is now with any oral intake. Ongoing persistent for 2 years worse for 6 months. Moderate to severe. It is in the upper esophageal region. Times makes it better or it is slow going down. She has had vomiting.   Acid reflux: ongoing persistent for years. She is not anything for her acid reflux currently. She has taken some over the counter medications. She has burning in her throat. This occurs a few times per week and dependent on what she eats.   Chronic constipation she has had lifelong for years.   She has a family hx for celiac disease.      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    Coronary artery disease One day after Birth    Trilogy of Fallot    Rich-Danlos syndrome type III     GERD (gastroesophageal reflux disease)     Headache     Heart murmur     Heart valve disease One Day after Birth    Have had two Pulmonary valve Replacement surgeries    History of medical problems     CHD (tetralogy of Fallot), Tether cord, Rich-Danlos, Dysautonomia    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    Hyperlipidemia     Working on it    Low back pain     Sleep apnea 2023    Use Cpap    Urinary tract infection [facility]    Missed Tethered Cord until age 13     Past Surgical History:   Procedure Laterality Date    CARDIAC CATHETERIZATION  2013    CARDIAC SURGERY  2004,2016    Trilogy of Fallot  repair and pulmonary valve replacement    CARDIAC VALVE REPLACEMENT      COLON SURGERY  2013     Perforation during testing    COLONOSCOPY  2014    Colonoscopy That led to Bowel perforation    CORONARY ARTERY BYPASS GRAFT  2004,2016    Trilogy of Fallot    ENDOSCOPY  05/03/2012    [place]-NEGATIVE FOR CELIAC SPRUE-INACTIVE GASTRITIS-NEG [facility]YL[place]I    SPINE SURGERY  2019,2019    The cord release and revision       Outpatient Medications Marked as Taking for the 1/20/25 encounter (Office Visit) with Tejal Perea APRN   Medication Sig Dispense Refill    Ascorbic Acid (Vitamin C) 500 MG capsule Daily.      Aspirin 81 MG capsule Take 81 mg by mouth Daily.      aspirin-acetaminophen-caffeine (EXCEDRIN MIGRAINE) 250-250-65 MG per tablet Take 1 tablet by mouth Every 6 (Six) Hours As Needed.      Atogepant (Qulipta) 10 MG tablet Take 1 tablet by mouth Daily. 30 tablet 11    Blisovi FE 1/20 1-20 MG-MCG per tablet Take 1 tablet by mouth Daily.      Cholecalciferol (D3-1000 PO) Daily.      DULoxetine (CYMBALTA) 30 MG capsule Take 1 capsule by mouth Daily.      ibuprofen (ADVIL,MOTRIN) 800 MG tablet Take 1 tablet by mouth Every 6 (Six) Hours As Needed for Mild Pain. for pain 90 tablet 3    lubiprostone (AMITIZA) 24 MCG capsule Take 1 capsule by mouth 2 (Two) Times a Day With Meals. 60 capsule 11    rosuvastatin (Crestor) 10 MG tablet Take 1 tablet by mouth Daily. 30 tablet 11    tiZANidine (Zanaflex) 4 MG tablet Take 1 tablet by mouth Every 8 (Eight) Hours As Needed for Muscle Spasms. 90 tablet 11    tolterodine LA (DETROL LA) 4 MG 24 hr capsule Take 1 capsule by mouth Daily. 30 capsule 11    topiramate (TOPAMAX) 50 MG tablet Take 1 tablet by mouth 2 (Two) Times a Day. 60 tablet 11     Allergies   Allergen Reactions    Sulfamethoxazole-Trimethoprim Anaphylaxis    Gluten Meal Other (See Comments)     Abd pain, chest pain      Social History     Socioeconomic History    Marital status: Single   Tobacco Use    Smoking status: Never     Passive exposure: Never    Smokeless tobacco: Never   Vaping Use    Vaping status:  Never Used   Substance and Sexual Activity    Alcohol use: Never    Drug use: Never    Sexual activity: Not Currently     Partners: Female, Male     Birth control/protection: Condom, Birth control pill, Partner of same sex     Family History   Problem Relation Age of Onset    Other (Other) Mother         ms    Anxiety disorder Mother     Thyroid disease Mother     Vision loss Mother     Cirrhosis Mother     Inflammatory bowel disease Mother     Hyperlipidemia Father         on meds    Anxiety disorder Sister     Celiac disease Sister     Cancer Maternal Grandmother     Depression Paternal Grandfather     Mental illness Paternal Grandfather     Colon cancer Neg Hx     Colon polyps Neg Hx      Health Maintenance   Topic Date Due    Pneumococcal Vaccine 0-64 (1 of 2 - PCV) 04/07/2010    HPV VACCINES (1 - 3-dose series) Never done    COVID-19 Vaccine (3 - 2024-25 season) 09/01/2024    ANNUAL PHYSICAL  09/03/2025    LIPID PANEL  09/04/2025    BMI FOLLOWUP  10/07/2025    TDAP/TD VACCINES (2 - Td or Tdap) 03/15/2027    HEPATITIS C SCREENING  Completed    INFLUENZA VACCINE  Completed    MENINGOCOCCAL VACCINE  Completed     Review of Systems   Constitutional:  Negative for activity change, appetite change, chills, diaphoresis, fatigue, fever and unexpected weight change.   HENT:  Positive for trouble swallowing. Negative for ear pain, hearing loss, mouth sores, sore throat and voice change.    Eyes: Negative.    Respiratory:  Negative for cough, choking, shortness of breath and wheezing.    Cardiovascular:  Negative for chest pain and palpitations.   Gastrointestinal:  Negative for abdominal pain, blood in stool, constipation, diarrhea, nausea and vomiting.   Endocrine: Negative for cold intolerance and heat intolerance.   Genitourinary:  Negative for decreased urine volume, dysuria, frequency, hematuria and urgency.   Musculoskeletal:  Negative for back pain, gait problem and myalgias.   Skin:  Negative for color change,  "pallor and rash.   Allergic/Immunologic: Negative for food allergies and immunocompromised state.   Neurological:  Negative for dizziness, tremors, seizures, syncope, weakness, light-headedness, numbness and headaches.   Hematological:  Negative for adenopathy. Does not bruise/bleed easily.   Psychiatric/Behavioral:  Negative for agitation and confusion. The patient is not nervous/anxious.    All other systems reviewed and are negative.    Objective   Vitals:    01/20/25 0923   BP: 110/70   BP Location: Left arm   Pulse: 96   Temp: 97.8 °F (36.6 °C)   TempSrc: Temporal   SpO2: 97%   Weight: 114 kg (251 lb 9.6 oz)   Height: 177.8 cm (70\")     Body mass index is 36.1 kg/m².  Physical Exam  Constitutional:       Appearance: She is well-developed.   HENT:      Head: Normocephalic and atraumatic.   Eyes:      Pupils: Pupils are equal, round, and reactive to light.   Neck:      Trachea: No tracheal deviation.   Cardiovascular:      Rate and Rhythm: Normal rate and regular rhythm.      Heart sounds: Normal heart sounds. No murmur heard.     No friction rub. No gallop.   Pulmonary:      Effort: Pulmonary effort is normal. No respiratory distress.      Breath sounds: Normal breath sounds. No wheezing or rales.   Chest:      Chest wall: No tenderness.   Abdominal:      General: Bowel sounds are normal. There is no distension.      Palpations: Abdomen is soft. Abdomen is not rigid.      Tenderness: There is no abdominal tenderness. There is no guarding or rebound.   Musculoskeletal:         General: No tenderness or deformity. Normal range of motion.      Cervical back: Normal range of motion and neck supple.   Skin:     General: Skin is warm and dry.      Coloration: Skin is not pale.      Findings: No rash.   Neurological:      Mental Status: She is alert and oriented to person, place, and time.      Deep Tendon Reflexes: Reflexes are normal and symmetric.   Psychiatric:         Behavior: Behavior normal.         Thought " Content: Thought content normal.         Judgment: Judgment normal.       Assessment & Plan   Diagnoses and all orders for this visit:    1. Esophageal dysphagia (Primary)  -     Case Request; Standing  -     Case Request  -     FL ESOPHAGRAM SINGLE CONTRAST; Future    Other orders  -     Cancel: Implement Anesthesia Orders Day of Procedure; Standing  -     Follow Anesthesia Guidelines / Protocol; Future    I suggested Pepcid or Prevacid for her acid reflux especially if persistent occurring a few days per week.   I discussed non pharmaceutical treatment of gerd.  This includes gradually losing weight to achieve ideal body wt., elevation of the head of bed by 4-6 inches, nothing to eat or drink 3 hours prior to lying down, avoiding tight clothing, stress reduction, tobacco cessation, reduction of alcohol intake, and dietary restrictions (avoiding caffeine, coffee, fatty foods, mints, chocolate, spicy foods and tomato based sauces as much as possible).    Treat constipation with Miralax up to twice per day.   Increase water intake.     Long discussion today and we are going to start with esophagram to evaluate her esophagus.     ESOPHAGOGASTRODUODENOSCOPY WITH ANESTHESIA (N/A)  Part of this note may be an electronic transcription/translation of spoken language to printed text using the Dragon Dictation System.  Body mass index is 36.1 kg/m².  Return in about 3 weeks (around 2/10/2025).           All risks, benefits, alternatives, and indications of colonoscopy and/or Endoscopy procedure have been discussed with the patient. Risks to include perforation of the colon requiring possible surgery or colostomy, risk of bleeding from biopsies or removal of colon tissue, possibility of missing a colon polyp or cancer, or adverse drug reaction.  Benefits to include the diagnosis and management of disease of the colon and rectum. Alternatives to include barium enema, radiographic evaluation, lab testing or no intervention. Pt  verbalizes understanding and agrees.     Tejal Perea, APRN  1/20/2025  09:54 CST          If you smoke or use tobacco, 4 minutes reading provided  Steps to Quit Smoking  Smoking tobacco can be harmful to your health and can affect almost every organ in your body. Smoking puts you, and those around you, at risk for developing many serious chronic diseases. Quitting smoking is difficult, but it is one of the best things that you can do for your health. It is never too late to quit.  What are the benefits of quitting smoking?  When you quit smoking, you lower your risk of developing serious diseases and conditions, such as:  Lung cancer or lung disease, such as COPD.  Heart disease.  Stroke.  Heart attack.  Infertility.  Osteoporosis and bone fractures.  Additionally, symptoms such as coughing, wheezing, and shortness of breath may get better when you quit. You may also find that you get sick less often because your body is stronger at fighting off colds and infections. If you are pregnant, quitting smoking can help to reduce your chances of having a baby of low birth weight.  How do I get ready to quit?  When you decide to quit smoking, create a plan to make sure that you are successful. Before you quit:  Pick a date to quit. Set a date within the next two weeks to give you time to prepare.  Write down the reasons why you are quitting. Keep this list in places where you will see it often, such as on your bathroom mirror or in your car or wallet.  Identify the people, places, things, and activities that make you want to smoke (triggers) and avoid them. Make sure to take these actions:  Throw away all cigarettes at home, at work, and in your car.  Throw away smoking accessories, such as ashtrays and lighters.  Clean your car and make sure to empty the ashtray.  Clean your home, including curtains and carpets.  Tell your family, friends, and coworkers that you are quitting. Support from your loved ones can make  quitting easier.  Talk with your health care provider about your options for quitting smoking.  Find out what treatment options are covered by your health insurance.  What strategies can I use to quit smoking?  Talk with your healthcare provider about different strategies to quit smoking. Some strategies include:  Quitting smoking altogether instead of gradually lessening how much you smoke over a period of time. Research shows that quitting “cold turkey” is more successful than gradually quitting.  Attending in-person counseling to help you build problem-solving skills. You are more likely to have success in quitting if you attend several counseling sessions. Even short sessions of 10 minutes can be effective.  Finding resources and support systems that can help you to quit smoking and remain smoke-free after you quit. These resources are most helpful when you use them often. They can include:  Online chats with a counselor.  Telephone quitlines.  Printed self-help materials.  Support groups or group counseling.  Text messaging programs.  Mobile phone applications.  Taking medicines to help you quit smoking. (If you are pregnant or breastfeeding, talk with your health care provider first.) Some medicines contain nicotine and some do not. Both types of medicines help with cravings, but the medicines that include nicotine help to relieve withdrawal symptoms. Your health care provider may recommend:  Nicotine patches, gum, or lozenges.  Nicotine inhalers or sprays.  Non-nicotine medicine that is taken by mouth.  Talk with your health care provider about combining strategies, such as taking medicines while you are also receiving in-person counseling. Using these two strategies together makes you more likely to succeed in quitting than if you used either strategy on its own.  If you are pregnant or breastfeeding, talk with your health care provider about finding counseling or other support strategies to quit smoking. Do  not take medicine to help you quit smoking unless told to do so by your health care provider.  What things can I do to make it easier to quit?  Quitting smoking might feel overwhelming at first, but there is a lot that you can do to make it easier. Take these important actions:  Reach out to your family and friends and ask that they support and encourage you during this time. Call telephone quitlines, reach out to support groups, or work with a counselor for support.  Ask people who smoke to avoid smoking around you.  Avoid places that trigger you to smoke, such as bars, parties, or smoke-break areas at work.  Spend time around people who do not smoke.  Lessen stress in your life, because stress can be a smoking trigger for some people. To lessen stress, try:  Exercising regularly.  Deep-breathing exercises.  Yoga.  Meditating.  Performing a body scan. This involves closing your eyes, scanning your body from head to toe, and noticing which parts of your body are particularly tense. Purposefully relax the muscles in those areas.  Download or purchase mobile phone or tablet apps (applications) that can help you stick to your quit plan by providing reminders, tips, and encouragement. There are many free apps, such as QuitGuide from the CDC (Centers for Disease Control and Prevention). You can find other support for quitting smoking (smoking cessation) through smokefree.gov and other websites.  How will I feel when I quit smoking?  Within the first 24 hours of quitting smoking, you may start to feel some withdrawal symptoms. These symptoms are usually most noticeable 2-3 days after quitting, but they usually do not last beyond 2-3 weeks. Changes or symptoms that you might experience include:  Mood swings.  Restlessness, anxiety, or irritation.  Difficulty concentrating.  Dizziness.  Strong cravings for sugary foods in addition to nicotine.  Mild weight gain.  Constipation.  Nausea.  Coughing or a sore throat.  Changes  in how your medicines work in your body.  A depressed mood.  Difficulty sleeping (insomnia).  After the first 2-3 weeks of quitting, you may start to notice more positive results, such as:  Improved sense of smell and taste.  Decreased coughing and sore throat.  Slower heart rate.  Lower blood pressure.  Clearer skin.  The ability to breathe more easily.  Fewer sick days.  Quitting smoking is very challenging for most people. Do not get discouraged if you are not successful the first time. Some people need to make many attempts to quit before they achieve long-term success. Do your best to stick to your quit plan, and talk with your health care provider if you have any questions or concerns.  This information is not intended to replace advice given to you by your health care provider. Make sure you discuss any questions you have with your health care provider.  Document Released: 12/12/2002 Document Revised: 08/15/2017 Document Reviewed: 05/03/2016  [employer] Interactive Patient Education © 2017 [employer]

## 2025-01-23 ENCOUNTER — PRIOR AUTHORIZATION (OUTPATIENT)
Dept: INTERNAL MEDICINE | Facility: CLINIC | Age: 20
End: 2025-01-23
Payer: COMMERCIAL

## 2025-01-23 NOTE — TELEPHONE ENCOUNTER
YOEL MARQUEZ (Key: [id number])  PA Case ID #: [id number]  Rx #: [id number]  Need Help? Call us at [phone]  Status  sent iconSent to Plan today  Drug  Motegrity 2MG tablets  ePA cloud logo  Form  [employer] PA Form (2017 [employer])  Original Claim Info  75 +MUST USE LUBIPROSTONE, LINZESS OR MEDNEC PA ONLY [id number] REQUIRES PRIOR AUTHORIZATION(PHARMACY HELP DESK [phone])

## 2025-01-23 NOTE — TELEPHONE ENCOUNTER
YOEL MARQUEZ (Key: [id number])  PA Case ID #: [id number]  Rx #: [id number]  Need Help? Call us at [phone]  Status  sent iconSent to Plan today  Drug  Lubiprostone 24MCG capsules  ePA cloud logo  Form  [employer] PA Form (2017 N[employer])  Original Claim Info  74 PRIOR AUTH REQ-MD CALL [phone].DRUG REQUIRES PRIOR AUTHORIZATION(PHARMACY HELP DESK [phone])    YOEL MARQUEZ (Key: [id number])  PA Case ID #: [id number]  Rx #: [id number]  Need Help? Call us at [phone]  Outcome  Approved today by [employer] [facility] 2017  Your PA request has been approved. Additional information will be provided in the approval communication. (Message 1148)  Authorization Expiration Date: 1/23/2028  Drug  Lubiprostone 24MCG capsules  ePA cloud logo  Form  [employer] PA Form (2017 N[employer])  Original Claim Info  75 PRIOR AUTH FRANKO-MD CALL [phone].DRUG REQUIRES PRIOR AUTHORIZATION(PHARMACY HELP DESK [phone])

## 2025-01-24 ENCOUNTER — TELEPHONE (OUTPATIENT)
Dept: INTERNAL MEDICINE | Facility: CLINIC | Age: 20
End: 2025-01-24
Payer: COMMERCIAL

## 2025-01-27 ENCOUNTER — OFFICE VISIT (OUTPATIENT)
Dept: INTERNAL MEDICINE | Facility: CLINIC | Age: 20
End: 2025-01-27
Payer: COMMERCIAL

## 2025-01-27 VITALS
WEIGHT: 241 LBS | HEART RATE: 85 BPM | TEMPERATURE: 98.9 F | BODY MASS INDEX: 34.5 KG/M2 | SYSTOLIC BLOOD PRESSURE: 138 MMHG | DIASTOLIC BLOOD PRESSURE: 77 MMHG | OXYGEN SATURATION: 98 % | HEIGHT: 70 IN

## 2025-01-27 DIAGNOSIS — G43.109 MIGRAINE WITH AURA AND WITHOUT STATUS MIGRAINOSUS, NOT INTRACTABLE: ICD-10-CM

## 2025-01-27 DIAGNOSIS — N92.0 SPOTTING BETWEEN MENSES: ICD-10-CM

## 2025-01-27 DIAGNOSIS — G43.E19 INTRACTABLE CHRONIC MIGRAINE WITH AURA AND WITHOUT STATUS MIGRAINOSUS: ICD-10-CM

## 2025-01-27 DIAGNOSIS — E06.3 HASHIMOTO'S THYROIDITIS: Primary | ICD-10-CM

## 2025-01-27 DIAGNOSIS — E78.2 MIXED HYPERLIPIDEMIA: ICD-10-CM

## 2025-01-27 PROBLEM — R07.89 OTHER CHEST PAIN: Status: RESOLVED | Noted: 2024-09-29 | Resolved: 2025-01-27

## 2025-01-27 PROBLEM — Q21.3 TETRALOGY OF FALLOT: Status: RESOLVED | Noted: 2025-01-21 | Resolved: 2025-01-27

## 2025-01-27 PROBLEM — Q21.12 PFO (PATENT FORAMEN OVALE): Status: RESOLVED | Noted: 2023-12-25 | Resolved: 2025-01-27

## 2025-01-27 PROBLEM — Z87.74: Status: RESOLVED | Noted: 2023-12-24 | Resolved: 2025-01-27

## 2025-01-27 PROBLEM — Q06.8 TETHERED CORD: Status: ACTIVE | Noted: 2025-01-21

## 2025-01-27 PROBLEM — Q06.8 TETHERED CORD: Status: RESOLVED | Noted: 2025-01-21 | Resolved: 2025-01-27

## 2025-01-27 PROBLEM — Q21.3 TETRALOGY OF FALLOT: Status: ACTIVE | Noted: 2025-01-21

## 2025-01-27 PROBLEM — Q06.8 OTHER SPECIFIED CONGENITAL MALFORMATIONS OF SPINAL CORD: Status: RESOLVED | Noted: 2019-01-12 | Resolved: 2025-01-27

## 2025-01-27 PROCEDURE — 99214 OFFICE O/P EST MOD 30 MIN: CPT | Performed by: FAMILY MEDICINE

## 2025-01-27 RX ORDER — NORETHINDRONE 5 MG/1
5 TABLET ORAL DAILY
COMMUNITY

## 2025-01-27 RX ORDER — OXYBUTYNIN CHLORIDE 10 MG/1
10 TABLET, EXTENDED RELEASE ORAL DAILY
COMMUNITY

## 2025-01-27 NOTE — PROGRESS NOTES
Subjective     Chief Complaint   Patient presents with    Anxiety    Depression       History of Present Illness    Patient's PMR from outside medical facility reviewed and noted.    Ellen Gusman is a 19 y.o. female who presents for a routine office visit.  Patient reports that she has been doing well has followed up with cardiology at [facility] since we saw her last she states she got a good report from them.  Patient would like to go ahead and get set up with OB/GYN.  She has had some intramenstrual spotting and would like to discuss this with them at this time.  Patient's last cholesterol was elevated we had started her on Colestid Crestor we will go ahead and recheck this at this time.  Patient's thyroid has been normal the last several visits but is due to recheck her Hashimoto's at this time.    Patient states that her migraines remain well-controlled with her Qulipta.        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    Coronary artery disease One day after Birth    Trilogy of Fallot    Rich-Danlos syndrome type III     GERD (gastroesophageal reflux disease)     Headache     Heart murmur     Heart valve disease One Day after Birth    Have had two Pulmonary valve Replacement surgeries    History of medical problems     CHD (tetralogy of Fallot), Tether cord, Rich-Danlos, Dysautonomia    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    Hyperlipidemia     Working on it    Low back pain     Sleep apnea 2023    Use Cpap    Urinary tract infection [facility]    Missed Tethered Cord until age 13     Past Surgical History:  Past Surgical History:   Procedure Laterality Date    CARDIAC CATHETERIZATION  2013    CARDIAC SURGERY  2004,2016    Trilogy of Fallot  repair and pulmonary valve  replacement    CARDIAC VALVE REPLACEMENT      COLON SURGERY  2013    Perforation during testing    COLONOSCOPY  2014    Colonoscopy That led to Bowel perforation    CORONARY ARTERY BYPASS GRAFT  2004,2016    Trilogy of Fallot    ENDOSCOPY  05/03/2012    [place]-NEGATIVE FOR CELIAC SPRUE-INACTIVE GASTRITIS-NEG HPYLORI    SPINE SURGERY  2019,2019    The cord release and revision     Social History:  reports that she has never smoked. She has never been exposed to tobacco smoke. She has never used smokeless tobacco. She reports that she does not drink alcohol and does not use drugs.    Family History: family history includes Anxiety disorder in her mother and sister; Cancer in her maternal grandmother; Celiac disease in her sister; Cirrhosis in her mother; Depression in her paternal grandfather; Hyperlipidemia in her father; Inflammatory bowel disease in her mother; Mental illness in her paternal grandfather; Other in her mother; Thyroid disease in her mother; Vision loss in her mother.      Allergies:  Allergies   Allergen Reactions    Sulfamethoxazole-Trimethoprim Anaphylaxis    Gluten Meal Other (See Comments)     Abd pain, chest pain      Medications:  Prior to Admission medications    Medication Sig Start Date End Date Taking? Authorizing Provider   Ascorbic Acid (Vitamin C) 500 MG capsule Daily.   Yes Bert Mcknight MD   Aspirin 81 MG capsule Take 81 mg by mouth Daily.   Yes Bert Mcknight MD   aspirin-acetaminophen-caffeine (EXCEDRIN MIGRAINE) 250-250-65 MG per tablet Take 1 tablet by mouth Every 6 (Six) Hours As Needed.   Yes Bert Mcknight MD   Atogepant (Qulipta) 10 MG tablet Take 1 tablet by mouth Daily. 9/3/24  Yes Keon Johns MD   Blisovi FE 1/20 1-20 MG-MCG per tablet Take 1 tablet by mouth Daily.   Yes Bert Mcknight MD   Cholecalciferol (D3-1000 PO) Daily.   Yes Bert Mcknight MD   DULoxetine (CYMBALTA) 30 MG capsule Take 1 capsule by mouth Daily. 8/18/24  " Yes ProviderBert MD   ibuprofen (ADVIL,MOTRIN) 800 MG tablet Take 1 tablet by mouth Every 6 (Six) Hours As Needed for Mild Pain. for pain 9/3/24  Yes Keon Johns MD   lubiprostone (AMITIZA) 24 MCG capsule Take 1 capsule by mouth 2 (Two) Times a Day With Meals. 9/4/24  Yes Keon Johns MD   Motegrity 2 MG tablet Take 1 tablet by mouth Daily. 9/3/24  Yes Keon Johns MD   norethindrone (AYGESTIN) 5 MG tablet Take 1 tablet by mouth Daily.   Yes Bert Mcknight MD   oxybutynin XL (DITROPAN-XL) 10 MG 24 hr tablet Take 1 tablet by mouth Daily.   Yes Bert Mcknight MD   rosuvastatin (Crestor) 10 MG tablet Take 1 tablet by mouth Daily. 10/7/24  Yes Keon Johns MD   tiZANidine (Zanaflex) 4 MG tablet Take 1 tablet by mouth Every 8 (Eight) Hours As Needed for Muscle Spasms. 10/7/24  Yes Keon Johns MD   tolterodine LA (DETROL LA) 4 MG 24 hr capsule Take 1 capsule by mouth Daily. 9/27/24  Yes Estefani Tolentino APRN   topiramate (TOPAMAX) 50 MG tablet Take 1 tablet by mouth 2 (Two) Times a Day. 9/3/24  Yes Keon Johns MD           Review of systems   negative unless otherwise specified above in HPI    Objective     Vital Signs: /77 (BP Location: Left arm, Patient Position: Sitting, Cuff Size: Adult)   Pulse 85   Temp 98.9 °F (37.2 °C) (Infrared)   Ht 177.8 cm (70\")   Wt 109 kg (241 lb)   SpO2 98%   BMI 34.58 kg/m²     Physical Exam  Vitals and nursing note reviewed.   Constitutional:       General: She is not in acute distress.     Appearance: Normal appearance.   HENT:      Head: Normocephalic.   Eyes:      Extraocular Movements: Extraocular movements intact.      Pupils: Pupils are equal, round, and reactive to light.   Cardiovascular:      Rate and Rhythm: Normal rate and regular rhythm.      Heart sounds: Normal heart sounds. No murmur heard.  Pulmonary:      Effort: Pulmonary effort is normal. No " respiratory distress.      Breath sounds: Normal breath sounds. No rhonchi or rales.   Abdominal:      General: Abdomen is flat. Bowel sounds are normal.      Palpations: Abdomen is soft.   Neurological:      General: No focal deficit present.      Mental Status: She is alert.                Results Reviewed:  Glucose   Date Value Ref Range Status   09/04/2024 84 70 - 99 mg/dL Final     BUN   Date Value Ref Range Status   09/04/2024 12 6 - 20 mg/dL Final   04/30/2021 11 7 - 25 mg/dL Final     Creatinine   Date Value Ref Range Status   09/04/2024 0.83 0.57 - 1.00 mg/dL Final   04/30/2021 0.60 0.60 - 1.20 mg/dL Final     Sodium   Date Value Ref Range Status   09/04/2024 136 134 - 144 mmol/L Final   04/30/2021 138 136 - 145 mmol/L Final     Potassium   Date Value Ref Range Status   09/04/2024 4.3 3.5 - 5.2 mmol/L Final   04/30/2021 5.3 (H) 3.5 - 5.1 mmol/L Final     Chloride   Date Value Ref Range Status   09/04/2024 103 96 - 106 mmol/L Final   04/30/2021 105 98 - 107 mmol/L Final     CO2   Date Value Ref Range Status   04/30/2021 24 21 - 31 mmol/L Final     Total CO2   Date Value Ref Range Status   09/04/2024 18 (L) 20 - 29 mmol/L Final     Calcium   Date Value Ref Range Status   09/04/2024 10.1 8.7 - 10.2 mg/dL Final   04/30/2021 10.1 8.6 - 10.3 mg/dL Final     ALT (SGPT)   Date Value Ref Range Status   09/04/2024 24 0 - 32 IU/L Final   04/30/2021 39 7 - 52 U/L Final     AST (SGOT)   Date Value Ref Range Status   09/04/2024 18 0 - 40 IU/L Final   04/30/2021 42 (H) 13 - 39 U/L Final     WBC   Date Value Ref Range Status   09/04/2024 6.3 3.4 - 10.8 x10E3/uL Final     Hematocrit   Date Value Ref Range Status   09/04/2024 46.0 34.0 - 46.6 % Final     Platelets   Date Value Ref Range Status   09/04/2024 260 150 - 450 x10E3/uL Final     Triglycerides   Date Value Ref Range Status   09/04/2024 161 (H) 0 - 149 mg/dL Final     HDL Cholesterol   Date Value Ref Range Status   09/04/2024 49 >39 mg/dL Final     LDL Chol Calc  ([facility])   Date Value Ref Range Status   09/04/2024 167 (H) 0 - 99 mg/dL Final             Procedure   Procedures       Assessment / Plan     Assessment/Plan:   Diagnosis Plan   1. Hashimoto's thyroiditis  TSH    Comprehensive metabolic panel      2. Mixed hyperlipidemia  Lipid Panel      3. Intractable chronic migraine with aura and without status migrainosus        4. Migraine with aura and without status migrainosus, not intractable        5. Spotting between menses  Ambulatory Referral to Obstetrics / Gynecology    CBC & Differential            Return in about 6 months (around 7/27/2025) for Recheck. unless patient needs to be seen sooner or acute issues arise.      I have discussed the patient results/orders and and plan/recommendation with them at today's visit.      Signed by:    Keon Johns MD Date: 01/27/25

## 2025-01-28 LAB
BASOPHILS # BLD AUTO: 0 X10E3/UL (ref 0–0.2)
BASOPHILS NFR BLD AUTO: 1 %
CHOLEST SERPL-MCNC: 155 MG/DL (ref 100–199)
EOSINOPHIL # BLD AUTO: 0.2 X10E3/UL (ref 0–0.4)
EOSINOPHIL NFR BLD AUTO: 5 %
ERYTHROCYTE [DISTWIDTH] IN BLOOD BY AUTOMATED COUNT: 12.4 % (ref 11.7–15.4)
HCT VFR BLD AUTO: 45 % (ref 34–46.6)
HDLC SERPL-MCNC: 48 MG/DL
HGB BLD-MCNC: 14.3 G/DL (ref 11.1–15.9)
IMM GRANULOCYTES # BLD AUTO: 0 X10E3/UL (ref 0–0.1)
IMM GRANULOCYTES NFR BLD AUTO: 0 %
LDLC SERPL CALC-MCNC: 81 MG/DL (ref 0–99)
LYMPHOCYTES # BLD AUTO: 1.1 X10E3/UL (ref 0.7–3.1)
LYMPHOCYTES NFR BLD AUTO: 22 %
MCH RBC QN AUTO: 30.3 PG (ref 26.6–33)
MCHC RBC AUTO-ENTMCNC: 31.8 G/DL (ref 31.5–35.7)
MCV RBC AUTO: 95 FL (ref 79–97)
MONOCYTES # BLD AUTO: 0.3 X10E3/UL (ref 0.1–0.9)
MONOCYTES NFR BLD AUTO: 6 %
NEUTROPHILS # BLD AUTO: 3.3 X10E3/UL (ref 1.4–7)
NEUTROPHILS NFR BLD AUTO: 66 %
PLATELET # BLD AUTO: 213 X10E3/UL (ref 150–450)
RBC # BLD AUTO: 4.72 X10E6/UL (ref 3.77–5.28)
TRIGL SERPL-MCNC: 153 MG/DL (ref 0–149)
TSH SERPL DL<=0.005 MIU/L-ACNC: 2.16 UIU/ML (ref 0.45–4.5)
VLDLC SERPL CALC-MCNC: 26 MG/DL (ref 5–40)
WBC # BLD AUTO: 5 X10E3/UL (ref 3.4–10.8)

## 2025-01-30 LAB
ALBUMIN SERPL-MCNC: 4.7 G/DL (ref 4–5)
ALP SERPL-CCNC: 66 IU/L (ref 42–106)
ALT SERPL-CCNC: 48 IU/L (ref 0–32)
AST SERPL-CCNC: 19 IU/L (ref 0–40)
BILIRUB SERPL-MCNC: <0.2 MG/DL (ref 0–1.2)
BUN SERPL-MCNC: 9 MG/DL (ref 6–20)
BUN/CREAT SERPL: 11 (ref 9–23)
CALCIUM SERPL-MCNC: 10 MG/DL (ref 8.7–10.2)
CHLORIDE SERPL-SCNC: 106 MMOL/L (ref 96–106)
CO2 SERPL-SCNC: 15 MMOL/L (ref 20–29)
CREAT SERPL-MCNC: 0.81 MG/DL (ref 0.57–1)
EGFRCR SERPLBLD CKD-EPI 2021: 107 ML/MIN/1.73
GLOBULIN SER CALC-MCNC: 2.9 G/DL (ref 1.5–4.5)
GLUCOSE SERPL-MCNC: 118 MG/DL (ref 70–99)
POTASSIUM SERPL-SCNC: 3.8 MMOL/L (ref 3.5–5.2)
PROT SERPL-MCNC: 7.6 G/DL (ref 6–8.5)
SODIUM SERPL-SCNC: 139 MMOL/L (ref 134–144)
WRITTEN AUTHORIZATION: NORMAL

## 2025-01-31 ENCOUNTER — HOSPITAL ENCOUNTER (OUTPATIENT)
Dept: GENERAL RADIOLOGY | Facility: HOSPITAL | Age: 20
Discharge: HOME | End: 2025-01-31
Admitting: CLINICAL NURSE SPECIALIST
Payer: COMMERCIAL

## 2025-01-31 DIAGNOSIS — R13.19 ESOPHAGEAL DYSPHAGIA: ICD-10-CM

## 2025-01-31 PROCEDURE — 74220 X-RAY XM ESOPHAGUS 1CNTRST: CPT

## 2025-01-31 RX ADMIN — BARIUM SULFATE 55 ML: 980 POWDER, FOR SUSPENSION ORAL at 10:57

## 2025-01-31 RX ADMIN — ANTACID/ANTIFLATULENT 1 PACKET: 380; 550; 10; 10 GRANULE, EFFERVESCENT ORAL at 10:57

## 2025-02-03 RX ORDER — DULOXETIN HYDROCHLORIDE 30 MG/1
30 CAPSULE, DELAYED RELEASE ORAL DAILY
Qty: 30 CAPSULE | Refills: 11 | Status: SHIPPED | OUTPATIENT
Start: 2025-02-03

## 2025-02-03 NOTE — TELEPHONE ENCOUNTER
Rx Refill Note  Requested Prescriptions     Pending Prescriptions Disp Refills    DULoxetine (CYMBALTA) 30 MG capsule       Sig: Take 1 capsule by mouth Daily.      Last office visit with prescribing clinician: 1/27/2025   Last telemedicine visit with prescribing clinician: Visit date not found   Next office visit with prescribing clinician: 9/22/2025                         Would you like a call back once the refill request has been completed: [] Yes [] No    If the office needs to give you a call back, can they leave a voicemail: [] Yes [] No    Shivani Mota RN  02/03/25, 13:30 CST

## 2025-02-05 ENCOUNTER — OFFICE VISIT (OUTPATIENT)
Dept: INTERNAL MEDICINE | Facility: CLINIC | Age: 20
End: 2025-02-05
Payer: COMMERCIAL

## 2025-02-05 VITALS
HEIGHT: 70 IN | OXYGEN SATURATION: 98 % | BODY MASS INDEX: 34.93 KG/M2 | HEART RATE: 78 BPM | SYSTOLIC BLOOD PRESSURE: 141 MMHG | TEMPERATURE: 98 F | WEIGHT: 244 LBS | DIASTOLIC BLOOD PRESSURE: 87 MMHG

## 2025-02-05 DIAGNOSIS — R11.0 NAUSEA: ICD-10-CM

## 2025-02-05 DIAGNOSIS — R52 GENERALIZED BODY ACHES: Primary | ICD-10-CM

## 2025-02-05 LAB
EXPIRATION DATE: NORMAL
EXPIRATION DATE: NORMAL
FLUAV AG UPPER RESP QL IA.RAPID: NOT DETECTED
FLUBV AG UPPER RESP QL IA.RAPID: NOT DETECTED
HETEROPH AB SER QL LA: NEGATIVE
INTERNAL CONTROL: NORMAL
INTERNAL CONTROL: NORMAL
Lab: NORMAL
Lab: NORMAL
SARS-COV-2 AG UPPER RESP QL IA.RAPID: NOT DETECTED

## 2025-02-05 PROCEDURE — 87428 SARSCOV & INF VIR A&B AG IA: CPT | Performed by: FAMILY MEDICINE

## 2025-02-05 PROCEDURE — 86308 HETEROPHILE ANTIBODY SCREEN: CPT | Performed by: FAMILY MEDICINE

## 2025-02-05 PROCEDURE — 99212 OFFICE O/P EST SF 10 MIN: CPT | Performed by: FAMILY MEDICINE

## 2025-02-05 NOTE — PROGRESS NOTES
Subjective     Chief Complaint   Patient presents with    Generalized Body Aches    Fatigue    Nausea    Headache     Could be from rain         Fatigue  Symptoms include fatigue, headaches and nausea.    Nausea  Symptoms include fatigue, headaches and nausea.    Headache      Patient's PMR from outside medical facility reviewed and noted.    Ellen Gusman is a 19 y.o. female who presents for a sick visit today.  The patient comes in complaining with symptoms of a Upper respiratory infection.   Patient reports facial pressure, Myalgias, arthralgias, and nausea.  patient reports no no other symptoms reported.  Patient reports that this has been going on for 3 days at this point.  Patient would like something to help with this at this time.         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    Coronary artery disease One day after Birth    Trilogy of Fallot    Rich-Danlos syndrome type III     GERD (gastroesophageal reflux disease)     Headache     Heart murmur     Heart valve disease One Day after Birth    Have had two Pulmonary valve Replacement surgeries    History of medical problems     CHD (tetralogy of Fallot), Tether cord, Rich-Danlos, Dysautonomia    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    Hyperlipidemia     Working on it    Low back pain     Sleep apnea 2023    Use Cpap    Urinary tract infection [facility]    Missed Tethered Cord until age 13     Past Surgical History:  Past Surgical History:   Procedure Laterality Date    CARDIAC CATHETERIZATION  2013    CARDIAC SURGERY  2004,2016    Trilogy of Fallot  repair and pulmonary valve replacement    CARDIAC VALVE REPLACEMENT      COLON SURGERY  2013    Perforation during testing    COLONOSCOPY  2014    Colonoscopy That led to  Bowel perforation    CORONARY ARTERY BYPASS GRAFT  2004,2016    Trilogy of Fallot    ENDOSCOPY  05/03/2012    [place]-NEGATIVE FOR CELIAC SPRUE-INACTIVE GASTRITIS-NEG HPYLORI    SPINE SURGERY  2019,2019    The cord release and revision     Social History:  reports that she has never smoked. She has never been exposed to tobacco smoke. She has never used smokeless tobacco. She reports that she does not drink alcohol and does not use drugs.    Family History: family history includes Anxiety disorder in her mother and sister; Cancer in her maternal grandmother; Celiac disease in her sister; Cirrhosis in her mother; Depression in her paternal grandfather; Hyperlipidemia in her father; Inflammatory bowel disease in her mother; Mental illness in her paternal grandfather; Other in her mother; Thyroid disease in her mother; Vision loss in her mother.      Allergies:  Allergies   Allergen Reactions    Sulfamethoxazole-Trimethoprim Anaphylaxis    Gluten Meal Other (See Comments)     Abd pain, chest pain      Medications:  Prior to Admission medications    Medication Sig Start Date End Date Taking? Authorizing Provider   Ascorbic Acid (Vitamin C) 500 MG capsule Daily.   Yes Bert Mcknight MD   Aspirin 81 MG capsule Take 81 mg by mouth Daily.   Yes Bert Mcknight MD   aspirin-acetaminophen-caffeine (EXCEDRIN MIGRAINE) 250-250-65 MG per tablet Take 1 tablet by mouth Every 6 (Six) Hours As Needed.   Yes Bert Mcknight MD   Atogepant (Qulipta) 10 MG tablet Take 1 tablet by mouth Daily. 9/3/24  Yes Keon Johns MD   Blisovi FE 1/20 1-20 MG-MCG per tablet Take 1 tablet by mouth Daily.   Yes Bert Mcknight MD   Cholecalciferol (D3-1000 PO) Daily.   Yes Bert Mcknight MD   DULoxetine (CYMBALTA) 30 MG capsule Take 1 capsule by mouth Daily. 2/3/25  Yes Keon Johns MD   ibuprofen (ADVIL,MOTRIN) 800 MG tablet Take 1 tablet by mouth Every 6 (Six) Hours As Needed for Mild Pain.  "for pain 9/3/24  Yes Keon Johns MD   lubiprostone (AMITIZA) 24 MCG capsule Take 1 capsule by mouth 2 (Two) Times a Day With Meals. 9/4/24  Yes Keon Johns MD   Motegrity 2 MG tablet Take 1 tablet by mouth Daily. 9/3/24  Yes Keon Johns MD   norethindrone (AYGESTIN) 5 MG tablet Take 1 tablet by mouth Daily.   Yes Provider, MD Bert   oxybutynin XL (DITROPAN-XL) 10 MG 24 hr tablet Take 1 tablet by mouth Daily.   Yes ProviderBert MD   rosuvastatin (Crestor) 10 MG tablet Take 1 tablet by mouth Daily. 10/7/24  Yes Keon Johns MD   tiZANidine (Zanaflex) 4 MG tablet Take 1 tablet by mouth Every 8 (Eight) Hours As Needed for Muscle Spasms. 10/7/24  Yes Keon Johns MD   tolterodine LA (DETROL LA) 4 MG 24 hr capsule Take 1 capsule by mouth Daily. 9/27/24  Yes Estefani Tolentino APRN   topiramate (TOPAMAX) 50 MG tablet Take 1 tablet by mouth 2 (Two) Times a Day. 9/3/24  Yes Keon Johns MD         Review of systems   negative unless otherwise specified above in HPI    Objective     Vital Signs: /87 (BP Location: Left arm, Patient Position: Sitting, Cuff Size: Adult)   Pulse 78   Temp 98 °F (36.7 °C) (Infrared)   Ht 177.8 cm (70\")   Wt 111 kg (244 lb)   SpO2 98%   BMI 35.01 kg/m²     Physical Exam  Vitals and nursing note reviewed.   Constitutional:       General: She is not in acute distress.     Appearance: Normal appearance.   HENT:      Head: Normocephalic.   Eyes:      Extraocular Movements: Extraocular movements intact.      Pupils: Pupils are equal, round, and reactive to light.   Cardiovascular:      Rate and Rhythm: Normal rate and regular rhythm.      Heart sounds: Normal heart sounds. No murmur heard.  Pulmonary:      Effort: Pulmonary effort is normal. No respiratory distress.      Breath sounds: Normal breath sounds. No rhonchi or rales.   Abdominal:      General: Abdomen is flat. Bowel sounds are " normal.      Palpations: Abdomen is soft.   Neurological:      General: No focal deficit present.      Mental Status: She is alert.                Results Reviewed:  Glucose   Date Value Ref Range Status   01/27/2025 118 (H) 70 - 99 mg/dL Final     BUN   Date Value Ref Range Status   01/27/2025 9 6 - 20 mg/dL Final   04/30/2021 11 7 - 25 mg/dL Final     Creatinine   Date Value Ref Range Status   01/27/2025 0.81 0.57 - 1.00 mg/dL Final   04/30/2021 0.60 0.60 - 1.20 mg/dL Final     Sodium   Date Value Ref Range Status   01/27/2025 139 134 - 144 mmol/L Final   04/30/2021 138 136 - 145 mmol/L Final     Potassium   Date Value Ref Range Status   01/27/2025 3.8 3.5 - 5.2 mmol/L Final   04/30/2021 5.3 (H) 3.5 - 5.1 mmol/L Final     Chloride   Date Value Ref Range Status   01/27/2025 106 96 - 106 mmol/L Final   04/30/2021 105 98 - 107 mmol/L Final     CO2   Date Value Ref Range Status   04/30/2021 24 21 - 31 mmol/L Final     Total CO2   Date Value Ref Range Status   01/27/2025 15 (L) 20 - 29 mmol/L Final     Comment:     **Verified by repeat analysis**     Calcium   Date Value Ref Range Status   01/27/2025 10.0 8.7 - 10.2 mg/dL Final   04/30/2021 10.1 8.6 - 10.3 mg/dL Final     ALT (SGPT)   Date Value Ref Range Status   01/27/2025 48 (H) 0 - 32 IU/L Final   04/30/2021 39 7 - 52 U/L Final     AST (SGOT)   Date Value Ref Range Status   01/27/2025 19 0 - 40 IU/L Final   04/30/2021 42 (H) 13 - 39 U/L Final     WBC   Date Value Ref Range Status   01/27/2025 5.0 3.4 - 10.8 x10E3/uL Final     Hematocrit   Date Value Ref Range Status   01/27/2025 45.0 34.0 - 46.6 % Final     Platelets   Date Value Ref Range Status   01/27/2025 213 150 - 450 x10E3/uL Final     Triglycerides   Date Value Ref Range Status   01/27/2025 153 (H) 0 - 149 mg/dL Final     HDL Cholesterol   Date Value Ref Range Status   01/27/2025 48 >39 mg/dL Final     LDL Chol Calc ([facility])   Date Value Ref Range Status   01/27/2025 81 0 - 99 mg/dL Final             Procedure    Procedures       Assessment / Plan     Assessment/Plan:   Diagnosis Plan   1. Generalized body aches  POCT SARS-CoV-2 + Flu Antigen JAIR    POC Infectious Mononucleosis Antibody      2. Nausea  POCT SARS-CoV-2 + Flu Antigen JAIR            Return in about 4 weeks (around 3/5/2025), or if symptoms worsen or fail to improve. unless patient needs to be seen sooner or acute issues arise.      I have discussed the patient results/orders and and plan/recommendation with them at today's visit.      Signed by:    Keon Johns MD Date: 02/05/25

## 2025-02-11 ENCOUNTER — OFFICE VISIT (OUTPATIENT)
Age: 20
End: 2025-02-11
Payer: COMMERCIAL

## 2025-02-11 VITALS
HEART RATE: 88 BPM | HEIGHT: 67 IN | DIASTOLIC BLOOD PRESSURE: 78 MMHG | WEIGHT: 250 LBS | BODY MASS INDEX: 39.24 KG/M2 | SYSTOLIC BLOOD PRESSURE: 139 MMHG

## 2025-02-11 DIAGNOSIS — N94.6 DYSMENORRHEA: ICD-10-CM

## 2025-02-11 DIAGNOSIS — N92.1 BREAKTHROUGH BLEEDING ON OCPS: Primary | ICD-10-CM

## 2025-02-11 DIAGNOSIS — Z11.3 SCREENING EXAMINATION FOR STI: ICD-10-CM

## 2025-02-11 LAB
B-HCG UR QL: NEGATIVE
EXPIRATION DATE: NORMAL
INTERNAL NEGATIVE CONTROL: NEGATIVE
INTERNAL POSITIVE CONTROL: POSITIVE
Lab: NORMAL

## 2025-02-11 RX ORDER — LEVONORGESTREL / ETHINYL ESTRADIOL AND ETHINYL ESTRADIOL 150-30(84)
1 KIT ORAL DAILY
Qty: 91 TABLET | Refills: 3 | Status: SHIPPED | OUTPATIENT
Start: 2025-02-11 | End: 2026-02-11

## 2025-02-11 NOTE — PROGRESS NOTES
Subjective   Chief Complaint   Patient presents with    Menstrual Problem     New patient ref from Dr. Johns, c/o spotting between periods. Currently taking Blisovi as OCP. Pt missed 3 pills and then had a long period. On OCP  for suspected endometriosis .LMP-12/2024. Pt states last had intercourse 2 months ago.        GAMALIEL Gusman is a 19 y.o. female.      History of Present Illness  The patient is a 19-year-old female who presents for evaluation of endometriosis.    She has a suspected diagnosis of endometriosis, a condition also present in her mother and sister. Her previous gynecologist did not conduct any diagnostic tests due to the presence of all indicative symptoms. She has been on a consistent regimen of low-dose (20mcg) birth control pills for over 5 years, which has effectively managed her heavy menstrual periods and associated cramping. She reports no changes in dosage or brand of her birth control pills during this period. She does not take the placebo pills at the end of each pack and reports no instances of bleeding or spotting, although she experiences cramping similar to menstrual pain. She has not undergone any ultrasounds or laparoscopic surgeries. She believes the birth control pills have been beneficial, as she experiences less pain when she takes them regularly. She continues to experience some pain but notes a significant increase in pain and heaviness of bleeding when she misses a dose.   She missed 3 doses of her birth control pills in late November 2024 or early December 2024 due to travel, which resulted in a prolonged bleeding episode lasting over a month. The bleeding initially presented as light but became heavier even after resuming her medication. The bleeding ceased after approximately 4 weeks. She describes the bleeding as initially dark brown, then normal, and finally returning to a dark color.   She identifies as bisexual. She is not planning to conceive in the near  future and needs reliable contraception due to her complicated medical history.       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 She was previously a 75% wheelchair user due to a tethered cord until the age of 13 but has since regained muscle strength in her legs and can walk without a wheelchair. She attempts to exercise at the gym but becomes easily breathless due to a heart condition. 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    She has a history of UTIs due to kidney issues.    Supplemental Information  She suspects she may have celiac disease and has been following a gluten-free diet for the past 5 years. She is currently experiencing abdominal pain due to accidental gluten ingestion. She has been on Intaglio for gastrointestinal issues for several years but is currently facing insurance-related challenges in obtaining it.    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    FAMILY HISTORY  Her mother has endometriosis and had a hysterectomy almost 20 years ago. Her sister also has endometriosis. Her mother had her thyroid removed.  See chart for other info.      ROS  Review of relevant systems completed and is as noted in the HPI.    PAST OB/GYN HISTORY:   OB History   No obstetric history on file.        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    Hyperlipidemia     Working on it    Low back pain     Sleep apnea 2023    Use Cpap    Status post repair of tethered spinal cord     Tetralogy of Fallot     Urinary tract infection [facility]    Missed Tethered Cord until age 13        PAST SURGICAL HISTORY:   Past Surgical History:   Procedure Laterality Date    CARDIAC CATHETERIZATION  2013    CARDIAC SURGERY  2004,2016    Trilogy of Fallot  repair and pulmonary valve replacement    CARDIAC VALVE REPLACEMENT      COLON SURGERY  2013    Perforation during testing    COLONOSCOPY  2014    Colonoscopy That led to Bowel perforation    ENDOSCOPY  05/03/2012    [place]-NEGATIVE FOR CELIAC SPRUE-INACTIVE GASTRITIS-NEG HPYLORI    SPINE SURGERY  2019,2019    The cord release and revision        SOCIAL HISTORY:   Social History     Socioeconomic History    Marital status: Single   Tobacco Use    Smoking status: Never     Passive exposure: Never    Smokeless tobacco: Never   Vaping Use    Vaping status: Never Used   Substance and Sexual Activity    Alcohol use: Never    Drug use: Never    Sexual activity: Not Currently     Partners: Female, Male     Birth control/protection: Condom, Birth control pill     Comment: bisexual        FAMILY HISTORY:   Family History   Problem Relation Age of Onset    Other (Other) Mother         ms    Anxiety disorder Mother     Thyroid disease Mother     Vision loss Mother     Cirrhosis Mother     Inflammatory bowel disease Mother     Hyperlipidemia Father         on meds    Anxiety disorder Sister     Celiac disease Sister     Cancer Maternal Grandmother     Depression Paternal Grandfather     Mental illness Paternal Grandfather     Colon cancer Neg Hx     Colon polyps Neg Hx         MEDICATIONS AND ALLERGIES ON CHART AND REVIEWED.      Objective   Physical Exam    Vitals:    02/11/25 1024   BP: 139/78   BP Location: Left arm   Patient Position:  "Sitting   Cuff Size: Adult   Pulse: 88   Weight: 113 kg (250 lb)   Height: 170 cm (66.93\")      General:  Alert and oriented x3, no acute distress  HEENT:  Normocephalic, atraumatic.  Mucous membranes moist and pink.  Neck:  Supple.  No adenopathy, no thyromegaly.  Lungs:  Clear to auscultation bilaterally.  No rales or ronchi.  Normal respiratory effort.  Heart:  Regular rate and rhythm.  S1, S2 normal.  Heart murmur audible.    Abdomen:  Soft, nontender, nondistended, normal bowel sounds.  No organomegaly or masses.  Exam is limited by body habitus.  Extremeties:  No cyanosis, clubbing or edema.  Back:  No CVA tenderness  Skin:  Warm and dry.  No rashes or suspicious lesions noted.  Pelvic exam: deferred      PROCEDURES  Brief Urine Lab Results  (Last result in the past 365 days)        Color   Clarity   Blood   Leuk Est   Nitrite   Protein   CREAT   Urine HCG        02/11/25 1141               Negative              Assessment & Plan        Diagnoses and all orders for this visit:    1. Breakthrough bleeding on OCPs (Primary)  -     POC Pregnancy, Urine    2. Dysmenorrhea  -     Levonorgest-Eth Estrad 91-Day (Seasonique) 0.15-0.03 &0.01 MG; Take 1 tablet by mouth Daily. Skip the placebo/blank pills every other pack.  Dispense: 91 tablet; Refill: 3  -     POC Pregnancy, Urine    3. Screening examination for STI  -     Chlamydia trachomatis, Neisseria gonorrhoeae, PCR - , Urine, Clean Catch; Future  -     Chlamydia trachomatis, Neisseria gonorrhoeae, PCR - Urine, Urine, Clean Catch             Assessment & Plan  1. Breakthrough bleeding on OCPs.  The patient has a family history of endometriosis and has been on a low-dose birth control pill for over 5 years to manage symptoms of heavy and painful periods. She experienced breakthrough bleeding after missing 3 pills in late November or early December 2024, which lasted for about 4 weeks. The bleeding has since stopped. She reports that the birth control pill generally " helps with her symptoms, but she still experiences some pain. She was informed that the low-dose pill can lead to more breakthrough bleeding and spotting, especially when taken continuously without periodic withdrawal bleeds. The potential impact of long-term use of low-dose pills on bone density was also discussed. She was advised to switch to a standard-dose birth control pill to help stabilize her menstrual cycle and reduce the risk of bone density changes. She was also informed about the Nexplanon implant as an alternative option for contraception and endometriosis management. She expressed interest in switching to a standard-dose pill. She was advised to allow a withdrawal bleed every 3 to 6 months to prevent breakthrough spotting. A pregnancy test will be conducted today due to her recent missed pills. A prescription for a 90-pill pack of standard-dose birth control pills will be provided.  STI urine screening also sent today.        Follow-up  The patient will follow up in April 2025 for a Pap smear test.    Return in about 2 months (around 4/11/2025) for Annual physical.    Patient or patient representative verbalized consent for the use of Ambient Listening during the visit with  Kasie Cao MD for chart documentation. 2/11/2025  11:38 CST    Kasie Cao MD  [facility]

## 2025-02-13 LAB
C TRACH RRNA SPEC QL NAA+PROBE: NEGATIVE
N GONORRHOEA RRNA SPEC QL NAA+PROBE: NEGATIVE

## 2025-02-26 ENCOUNTER — OFFICE VISIT (OUTPATIENT)
Dept: GASTROENTEROLOGY | Facility: CLINIC | Age: 20
End: 2025-02-26
Payer: COMMERCIAL

## 2025-02-26 VITALS
OXYGEN SATURATION: 98 % | BODY MASS INDEX: 38.33 KG/M2 | WEIGHT: 244.2 LBS | TEMPERATURE: 97.8 F | HEIGHT: 67 IN | DIASTOLIC BLOOD PRESSURE: 80 MMHG | HEART RATE: 87 BPM | SYSTOLIC BLOOD PRESSURE: 130 MMHG

## 2025-02-26 DIAGNOSIS — Q79.60 EHLERS-DANLOS SYNDROME: ICD-10-CM

## 2025-02-26 DIAGNOSIS — Z78.9 NONSMOKER: ICD-10-CM

## 2025-02-26 DIAGNOSIS — R13.19 ESOPHAGEAL DYSPHAGIA: Primary | ICD-10-CM

## 2025-02-26 PROCEDURE — 99213 OFFICE O/P EST LOW 20 MIN: CPT | Performed by: CLINICAL NURSE SPECIALIST

## 2025-02-26 NOTE — PROGRESS NOTES
Ellen Gusman  2005      3/3/2025  Chief Complaint   Patient presents with    GI Problem     3 week fu-dysphagia-still having some problems         HPI    Ellen Gusman is a  19 y.o. female here for a follow up visit for dysphagia. This is with medicaitons and at times most oral intake. Moderate for her. For 2 years or more. Upper esophageal region. Acid reflux is stable with over the counter regimen and dietary modifications. Today we have discussed esophagram and this was unremarkable. No nausea or vomiting. No abdominal pain. With her high risk of perforation with her connective tissue disorder we are trying to avoid endoscopy. She agrees. No wt loss. No fever chills or sweats.     IMPRESSION:  1. Normal esophagram.  2. Fluoroscopy time: 1 minute 13 seconds.  3. The dose: 54.9mGy.  4. Number of images: 30.        This report was signed and finalized on 1/31/2025 11:46 AM by Dr. Sarah Gardner MD.  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    Hyperlipidemia     Working on it    Low back pain     Sleep apnea 2023    Use Cpap    Status post repair of tethered spinal cord     Tetralogy of Fallot     Urinary tract infection [facility]    Missed Tethered Cord until age 13     Past Surgical History:   Procedure Laterality Date    CARDIAC CATHETERIZATION  2013    CARDIAC SURGERY   2004,2016    Trilogy of Fallot  repair and pulmonary valve replacement    CARDIAC VALVE REPLACEMENT      COLON SURGERY  2013    Perforation during testing    COLONOSCOPY  2014    Colonoscopy That led to Bowel perforation    ENDOSCOPY  05/03/2012    [place]-NEGATIVE FOR CELIAC SPRUE-INACTIVE GASTRITIS-NEG HPYLORI    SPINE SURGERY  2019,2019    The cord release and revision       Outpatient Medications Marked as Taking for the 2/26/25 encounter (Office Visit) with Tejal Perea APRN   Medication Sig Dispense Refill    Ascorbic Acid (Vitamin C) 500 MG capsule Daily.      Aspirin 81 MG capsule Take 81 mg by mouth Daily.      aspirin-acetaminophen-caffeine (EXCEDRIN MIGRAINE) 250-250-65 MG per tablet Take 1 tablet by mouth Every 6 (Six) Hours As Needed.      Atogepant (Qulipta) 10 MG tablet Take 1 tablet by mouth Daily. 30 tablet 11    Cholecalciferol (D3-1000 PO) Daily.      DULoxetine (CYMBALTA) 30 MG capsule Take 1 capsule by mouth Daily. 30 capsule 11    ibuprofen (ADVIL,MOTRIN) 800 MG tablet Take 1 tablet by mouth Every 6 (Six) Hours As Needed for Mild Pain. for pain 90 tablet 3    Levonorgest-Eth Estrad 91-Day (Seasonique) 0.15-0.03 &0.01 MG Take 1 tablet by mouth Daily. Skip the placebo/blank pills every other pack. 91 tablet 3    lubiprostone (AMITIZA) 24 MCG capsule Take 1 capsule by mouth 2 (Two) Times a Day With Meals. 60 capsule 11    rosuvastatin (Crestor) 10 MG tablet Take 1 tablet by mouth Daily. 30 tablet 11    tiZANidine (Zanaflex) 4 MG tablet Take 1 tablet by mouth Every 8 (Eight) Hours As Needed for Muscle Spasms. 90 tablet 11    tolterodine LA (DETROL LA) 4 MG 24 hr capsule Take 1 capsule by mouth Daily. 30 capsule 11    topiramate (TOPAMAX) 50 MG tablet Take 1 tablet by mouth 2 (Two) Times a Day. 60 tablet 11       Allergies   Allergen Reactions    Sulfamethoxazole-Trimethoprim Anaphylaxis    Gluten Meal Other (See Comments)     Abd pain, chest pain        Social History     Socioeconomic  History    Marital status: Single   Tobacco Use    Smoking status: Never     Passive exposure: Never    Smokeless tobacco: Never   Vaping Use    Vaping status: Never Used   Substance and Sexual Activity    Alcohol use: Never    Drug use: Never    Sexual activity: Not Currently     Partners: Female, Male     Birth control/protection: Condom, Birth control pill     Comment: bisexual       Family History   Problem Relation Age of Onset    Other (Other) Mother         ms    Anxiety disorder Mother     Thyroid disease Mother     Vision loss Mother     Cirrhosis Mother     Inflammatory bowel disease Mother     Hyperlipidemia Father         on meds    Anxiety disorder Sister     Celiac disease Sister     Cancer Maternal Grandmother     Depression Paternal Grandfather     Mental illness Paternal Grandfather     Colon cancer Neg Hx     Colon polyps Neg Hx        Review of Systems   Constitutional:  Negative for activity change, appetite change, chills, diaphoresis, fatigue, fever and unexpected weight change.   HENT:  Positive for trouble swallowing. Negative for ear pain, hearing loss, mouth sores, sore throat and voice change.    Eyes: Negative.    Respiratory:  Negative for cough, choking, shortness of breath and wheezing.    Cardiovascular:  Negative for chest pain and palpitations.   Gastrointestinal:  Negative for abdominal pain, blood in stool, constipation, diarrhea, nausea and vomiting.   Endocrine: Negative for cold intolerance and heat intolerance.   Genitourinary:  Negative for decreased urine volume, dysuria, frequency, hematuria and urgency.   Musculoskeletal:  Negative for back pain, gait problem and myalgias.   Skin:  Negative for color change, pallor and rash.   Allergic/Immunologic: Negative for food allergies and immunocompromised state.   Neurological:  Negative for dizziness, tremors, seizures, syncope, weakness, light-headedness, numbness and headaches.   Hematological:  Negative for adenopathy. Does  "not bruise/bleed easily.   Psychiatric/Behavioral:  Negative for agitation and confusion. The patient is not nervous/anxious.    All other systems reviewed and are negative.      /80 (BP Location: Left arm)   Pulse 87   Temp 97.8 °F (36.6 °C) (Temporal)   Ht 170 cm (66.93\")   Wt 111 kg (244 lb 3.2 oz)   SpO2 98%   BMI 38.33 kg/m²   Body mass index is 38.33 kg/m².    Physical Exam  Constitutional:       Appearance: She is well-developed.   HENT:      Head: Normocephalic and atraumatic.   Eyes:      Pupils: Pupils are equal, round, and reactive to light.   Neck:      Trachea: No tracheal deviation.   Cardiovascular:      Rate and Rhythm: Normal rate and regular rhythm.      Heart sounds: Normal heart sounds. No murmur heard.     No friction rub. No gallop.   Pulmonary:      Effort: Pulmonary effort is normal. No respiratory distress.      Breath sounds: Normal breath sounds. No wheezing or rales.   Chest:      Chest wall: No tenderness.   Abdominal:      General: Bowel sounds are normal. There is no distension.      Palpations: Abdomen is soft. Abdomen is not rigid.      Tenderness: There is no abdominal tenderness. There is no guarding or rebound.   Musculoskeletal:         General: No tenderness or deformity. Normal range of motion.      Cervical back: Normal range of motion and neck supple.   Skin:     General: Skin is warm and dry.      Coloration: Skin is not pale.      Findings: No rash.   Neurological:      Mental Status: She is alert and oriented to person, place, and time.      Deep Tendon Reflexes: Reflexes are normal and symmetric.   Psychiatric:         Behavior: Behavior normal.         Thought Content: Thought content normal.         Judgment: Judgment normal.         ASSESSMENT AND PLAN         Diagnoses and all orders for this visit:    1. Esophageal dysphagia (Primary)    2. Rich-Danlos syndrome    3. Nonsmoker    She has type III EDS or Rich-Danlos syndrome and this is connective " tissue with multiple perforations when having endoscopy. I do not feel she is a good candidate for scopes in [place] and have made several specialist recommendations in the [facility] or [place] area. She has the contact information as well when she decides where she would like to go. PCP may also help her with referral as well.         There are no Patient Instructions on file for this visit.  Tejal Perea, APRN  16:08 CST  3/3/2025    Obesity, Adult  Obesity is the condition of having too much total body fat. Being overweight or obese means that your weight is greater than what is considered healthy for your body size. Obesity is determined by a measurement called BMI. BMI is an estimate of body fat and is calculated from height and weight. For adults, a BMI of 30 or higher is considered obese.  Obesity can eventually lead to other health concerns and major illnesses, including:  Stroke.  Coronary artery disease (CAD).  Type 2 diabetes.  Some types of cancer, including cancers of the colon, breast, uterus, and gallbladder.  Osteoarthritis.  High blood pressure (hypertension).  High cholesterol.  Sleep apnea.  Gallbladder stones.  Infertility problems.  What are the causes?  The main cause of obesity is taking in (consuming) more calories than your body uses for energy. Other factors that contribute to this condition may include:  Being born with genes that make you more likely to become obese.  Having a medical condition that causes obesity. These conditions include:  Hypothyroidism.  Polycystic ovarian syndrome (PCOS).  Binge-eating disorder.  Cushing syndrome.  Taking certain medicines, such as steroids, antidepressants, and seizure medicines.  Not being physically active (sedentary lifestyle).  Living where there are limited places to exercise safely or buy healthy foods.  Not getting enough sleep.  What increases the risk?  The following factors may increase your risk of this  condition:  Having a family history of obesity.  Being a woman of -American descent.  Being a man of  descent.  What are the signs or symptoms?  Having excessive body fat is the main symptom of this condition.  How is this diagnosed?  This condition may be diagnosed based on:  Your symptoms.  Your medical history.  A physical exam. Your health care provider may measure:  Your BMI. If you are an adult with a BMI between 25 and less than 30, you are considered overweight. If you are an adult with a BMI of 30 or higher, you are considered obese.  The distances around your hips and your waist (circumferences). These may be compared to each other to help diagnose your condition.  Your skinfold thickness. Your health care provider may gently pinch a fold of your skin and measure it.  How is this treated?  Treatment for this condition often includes changing your lifestyle. Treatment may include some or all of the following:  Dietary changes. Work with your health care provider and a dietitian to set a weight-loss goal that is healthy and reasonable for you. Dietary changes may include eating:  Smaller portions. A portion size is the amount of a particular food that is healthy for you to eat at one time. This varies from person to person.  Low-calorie or low-fat options.  More whole grains, fruits, and vegetables.  Regular physical activity. This may include aerobic activity (cardio) and strength training.  Medicine to help you lose weight. Your health care provider may prescribe medicine if you are unable to lose 1 pound a week after 6 weeks of eating more healthily and doing more physical activity.  Surgery. Surgical options may include gastric banding and gastric bypass. Surgery may be done if:  Other treatments have not helped to improve your condition.  You have a BMI of 40 or higher.  You have life-threatening health problems related to obesity.  Follow these instructions at home:     Eating and  drinking     Follow recommendations from your health care provider about what you eat and drink. Your health care provider may advise you to:  Limit fast foods, sweets, and processed snack foods.  Choose low-fat options, such as low-fat milk instead of whole milk.  Eat 5 or more servings of fruits or vegetables every day.  Eat at home more often. This gives you more control over what you eat.  Choose healthy foods when you eat out.  Learn what a healthy portion size is.  Keep low-fat snacks on hand.  Avoid sugary drinks, such as soda, fruit juice, iced tea sweetened with sugar, and flavored milk.  Eat a healthy breakfast.  Drink enough water to keep your urine clear or pale yellow.  Do not go without eating for long periods of time (do not fast) or follow a fad diet. Fasting and fad diets can be unhealthy and even dangerous.  Physical Activity   Exercise regularly, as told by your health care provider. Ask your health care provider what types of exercise are safe for you and how often you should exercise.  Warm up and stretch before being active.  Cool down and stretch after being active.  Rest between periods of activity.  Lifestyle   Limit the time that you spend in front of your TV, computer, or video game system.  Find ways to reward yourself that do not involve food.  Limit alcohol intake to no more than 1 drink a day for nonpregnant women and 2 drinks a day for men. One drink equals 12 oz of beer, 5 oz of wine, or 1½ oz of hard liquor.  General instructions   Keep a weight loss journal to keep track of the food you eat and how much you exercise you get.  Take over-the-counter and prescription medicines only as told by your health care provider.  Take vitamins and supplements only as told by your health care provider.  Consider joining a support group. Your health care provider may be able to recommend a support group.  Keep all follow-up visits as told by your health care provider. This is important.  Contact  a health care provider if:  You are unable to meet your weight loss goal after 6 weeks of dietary and lifestyle changes.  This information is not intended to replace advice given to you by your health care provider. Make sure you discuss any questions you have with your health care provider.  Document Released: 01/25/2006 Document Revised: 05/22/2017 Document Reviewed: 10/05/2016  [employer] Interactive Patient Education © 2017 [employer]      IF YOU SMOKE OR USE TOBACCO PLEASE READ THE FOLLOWING:    Why is smoking bad for me?  Smoking increases the risk of heart disease, lung disease, vascular disease, stroke, and cancer.     If you smoke, STOP!    If you would like more information on quitting smoking, please visit the [employer] website: [email or web address]   This link will provide additional resources including the QUIT line and the Beat the Pack support groups.     For more information:    Quit Now [place]  1-800-QUIT-NOW  https://[place].quitlogix.org/en-[place]/

## 2025-03-25 ENCOUNTER — TELEPHONE (OUTPATIENT)
Dept: INTERNAL MEDICINE | Facility: CLINIC | Age: 20
End: 2025-03-25
Payer: COMMERCIAL

## 2025-03-25 NOTE — TELEPHONE ENCOUNTER
Caller: Ellen Gusman    Relationship to patient: Self    Best call back number: [phone]     Chief complaint: CHEST TIGHTNESS, HEADACHE, HARD TIME SWALLOWING    Patient directed to call 911 or go to their nearest emergency room.     Patient verbalized understanding: [x] Yes  [] No  If no, why?    Additional notes: PATIENT STATED THAT SHE IS A HEART PATIENT AS WELL.  PATIENT STATES THAT SHE IS GOING TO GO TO [facility].

## 2025-03-31 ENCOUNTER — OFFICE VISIT (OUTPATIENT)
Dept: NEUROLOGY | Facility: CLINIC | Age: 20
End: 2025-03-31
Payer: COMMERCIAL

## 2025-03-31 VITALS
DIASTOLIC BLOOD PRESSURE: 90 MMHG | HEIGHT: 67 IN | RESPIRATION RATE: 16 BRPM | SYSTOLIC BLOOD PRESSURE: 140 MMHG | WEIGHT: 253 LBS | HEART RATE: 84 BPM | BODY MASS INDEX: 39.71 KG/M2

## 2025-03-31 DIAGNOSIS — G43.909 EPISODIC MIGRAINE: ICD-10-CM

## 2025-03-31 DIAGNOSIS — G43.E19 INTRACTABLE CHRONIC MIGRAINE WITH AURA AND WITHOUT STATUS MIGRAINOSUS: Primary | ICD-10-CM

## 2025-03-31 RX ORDER — TOPIRAMATE 50 MG/1
50 TABLET, FILM COATED ORAL 2 TIMES DAILY
Qty: 180 TABLET | Refills: 1 | Status: SHIPPED | OUTPATIENT
Start: 2025-03-31

## 2025-04-04 ENCOUNTER — PATIENT ROUNDING (BHMG ONLY) (OUTPATIENT)
Dept: NEUROLOGY | Facility: CLINIC | Age: 20
End: 2025-04-04
Payer: COMMERCIAL

## 2025-04-04 NOTE — PROGRESS NOTES
April 4, 2025    Message left requesting a return call.     Hello,     My name is         Before we get started may I verify your date of birth? 2005    I am calling to officially welcome you to our practice and ask about your recent visit. Is this a good time to talk?     Tell me about your visit with us. What things went well?         We're always looking for ways to make our patients' experiences even better. Do you have recommendations on ways we may improve?      Overall were you satisfied with your first visit to our practice?        I appreciate you taking the time to speak with me today. Is there anything else I can do for you?       Thank you, and have a great day.

## 2025-04-15 ENCOUNTER — OFFICE VISIT (OUTPATIENT)
Age: 20
End: 2025-04-15
Payer: COMMERCIAL

## 2025-04-15 VITALS
HEART RATE: 86 BPM | BODY MASS INDEX: 39.05 KG/M2 | HEIGHT: 67 IN | SYSTOLIC BLOOD PRESSURE: 112 MMHG | OXYGEN SATURATION: 98 % | WEIGHT: 248.8 LBS | DIASTOLIC BLOOD PRESSURE: 73 MMHG | TEMPERATURE: 97.8 F

## 2025-04-15 DIAGNOSIS — Z12.4 SCREENING FOR CERVICAL CANCER: ICD-10-CM

## 2025-04-15 DIAGNOSIS — Z01.419 ENCOUNTER FOR ANNUAL ROUTINE GYNECOLOGICAL EXAMINATION: Primary | ICD-10-CM

## 2025-04-15 NOTE — PROGRESS NOTES
Subjective   Ellen Gusman is a 20 y.o. female is being seen for consultation today at the request of Keon Johns* for difficult migraines.    History of Present Illness  Presents with several years of worsening and activity altering headaches now occurring 3 to 5 times per week, lasting hours, sometimes daily, associated with photophobia, phonophobia, osmophobia, nausea.  Has tried and failed multiple rescue and preventative medications - though some trials may have been brief due to failure or intolerance. Normal MRI brain w/wo at [facility] in 2023.  History of very significant pediatric heart, abdominal surgical issues.  Now enrolled at [facility].       The following portions of the patient's history were reviewed and updated as appropriate: allergies, current medications, past family history, past medical history, past social history, past surgical history and problem list.    Review of Systems   Constitutional: Negative.    Eyes:  Positive for visual disturbance.   Respiratory:  Positive for shortness of breath.    Cardiovascular: Negative.    Gastrointestinal:  Positive for GERD.   Genitourinary:  Positive for urinary incontinence.   Musculoskeletal:  Positive for back pain, gait problem and myalgias.   Neurological:  Positive for dizziness, light-headedness and headache.   Psychiatric/Behavioral: Negative.           Current Outpatient Medications:     Ascorbic Acid (Vitamin C) 500 MG capsule, Daily., Disp: , Rfl:     Aspirin 81 MG capsule, Take 81 mg by mouth Daily., Disp: , Rfl:     aspirin-acetaminophen-caffeine (EXCEDRIN MIGRAINE) 250-250-65 MG per tablet, Take 1 tablet by mouth Every 6 (Six) Hours As Needed., Disp: , Rfl:     Cholecalciferol (D3-1000 PO), Daily., Disp: , Rfl:     DULoxetine (CYMBALTA) 30 MG capsule, Take 1 capsule by mouth Daily., Disp: 30 capsule, Rfl: 11    ibuprofen (ADVIL,MOTRIN) 800 MG tablet, Take 1 tablet by mouth Every 6 (Six) Hours As Needed for Mild Pain.  for pain, Disp: 90 tablet, Rfl: 3    Levonorgest-Eth Estrad 91-Day (Seasonique) 0.15-0.03 &0.01 MG, Take 1 tablet by mouth Daily. Skip the placebo/blank pills every other pack., Disp: 91 tablet, Rfl: 3    rosuvastatin (Crestor) 10 MG tablet, Take 1 tablet by mouth Daily., Disp: 30 tablet, Rfl: 11    tiZANidine (Zanaflex) 4 MG tablet, Take 1 tablet by mouth Every 8 (Eight) Hours As Needed for Muscle Spasms., Disp: 90 tablet, Rfl: 11    tolterodine LA (DETROL LA) 4 MG 24 hr capsule, Take 1 capsule by mouth Daily., Disp: 30 capsule, Rfl: 11    topiramate (TOPAMAX) 50 MG tablet, Take 1 tablet by mouth 2 (Two) Times a Day., Disp: 180 tablet, Rfl: 1    rimegepant sulfate ODT (NURTEC-ODT) 75 MG disintegrating tablet, Place 1 tablet under the tongue Every Other Day., Disp: 16 tablet, Rfl: 3     Objective   Physical Exam  Vitals and nursing note reviewed.   Eyes:      General: Vision grossly intact. Gaze aligned appropriately.   Cardiovascular:      Rate and Rhythm: Normal rate.      Heart sounds: Murmur heard.   Pulmonary:      Effort: Pulmonary effort is normal.   Neurological:      Mental Status: She is alert and oriented to person, place, and time.      GCS: GCS eye subscore is 4. GCS verbal subscore is 5. GCS motor subscore is 6.      Cranial Nerves: Cranial nerves 2-12 are intact.      Sensory: Sensation is intact.      Motor: Motor function is intact.      Coordination: Coordination is intact.      Gait: Gait abnormal.      Deep Tendon Reflexes: Reflexes are normal and symmetric.   Psychiatric:         Attention and Perception: Attention normal.         Mood and Affect: Mood normal.         Speech: Speech normal.         Behavior: Behavior normal.         Cognition and Memory: Cognition normal.       MRI BRAIN WO CONTRAST (05/01/2021 15:58 EDT)    CT HEAD WO CONTRAST (02/10/2023 16:57 EST)    MRI CYBERKNIFE BRAIN W WO CONTRAST (02/10/2023 22:54 EST)    MRI reviewed, 2023 no acute or significant issue, no lesion  needing follow up imaging.    Assessment & Plan   Diagnoses and all orders for this visit:    1. Intractable chronic migraine with aura and without status migrainosus (Primary)  -     topiramate (TOPAMAX) 50 MG tablet; Take 1 tablet by mouth 2 (Two) Times a Day.  Dispense: 180 tablet; Refill: 1  -     rimegepant sulfate ODT (NURTEC-ODT) 75 MG disintegrating tablet; Place 1 tablet under the tongue Every Other Day.  Dispense: 16 tablet; Refill: 3    2. Episodic migraine  -     rimegepant sulfate ODT (NURTEC-ODT) 75 MG disintegrating tablet; Place 1 tablet under the tongue Every Other Day.  Dispense: 16 tablet; Refill: 3      Has tolerated low dose topiramate, will work to increase this.  I feel best, safest choice migraine rescue medication in this 20 year old, multiple heart issues, history of asthma and previous seizures is rimegepant 75 mg when needed.  If topiramate is not tolerated, I would lean towards Zonegran as a next line for prevention.                Dictated utilizing Dragon dictation.

## 2025-04-15 NOTE — PROGRESS NOTES
Subjective   Chief Complaint   Patient presents with    Gynecologic Exam     Patient here today for annual gyn exam. No symptoms or concerns reported today         HPI  Ellen Gusman is a 20 y.o. female.      History of Present Illness  The patient is a 20-year-old female who presents for a Pap smear.  She has never had a pelvic exam.    I saw her 2 months ago for breakthrough bleeding on long-term continuous use of low-dose OCPs.  We switched her prescription to a 90-day 30mcg pill with good results.  Adherence to the prescribed contraceptive regimen is reported without any deviations. No adverse effects or unusual symptoms such as cramping or bleeding have been noted.     CONTRACEPTION: Currently on a new dose of oral contraceptive pills. No side effects reported.     ROS  Review of relevant systems completed and is as noted in the HPI.    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11    Sleep apnea 2023    Use Cpap    Status post repair of tethered spinal cord     Tetralogy of Fallot     Urinary incontinence [facility]    Missed Tethered Cord until age 13    Urinary tract infection [facility]    Missed Tethered Cord until age 13        PAST SURGICAL HISTORY:   Past Surgical History:   Procedure Laterality Date    CARDIAC CATHETERIZATION  2013    CARDIAC SURGERY  2004,2016    Trilogy of Fallot  repair and pulmonary valve replacement    CARDIAC VALVE REPLACEMENT      COLON SURGERY  2013    Perforation during testing    COLONOSCOPY  2014    Colonoscopy That led to Bowel perforation    CORONARY ARTERY BYPASS GRAFT  2004,2016    ENDOSCOPY  05/03/2012    [place]-NEGATIVE FOR CELIAC SPRUE-INACTIVE GASTRITIS-NEG HPYLORI    SPINE SURGERY  2019,2019    The cord release and revision    WISDOM TOOTH EXTRACTION  July 2024        SOCIAL HISTORY:   Social History     Socioeconomic History    Marital status: Single   Tobacco Use    Smoking status: Never     Passive exposure: Never    Smokeless tobacco: Never   Vaping Use    Vaping status: Never Used   Substance and Sexual Activity    Alcohol use: Never    Drug use: Never    Sexual activity: Not Currently     Partners: Female, Male     Birth control/protection: Condom, Birth control pill, Partner of same sex     Comment: bisexual        FAMILY HISTORY:   Family History   Problem Relation Age of Onset    Other (Other) Mother         ms    Anxiety disorder Mother     Thyroid disease Mother     Vision loss Mother     Cirrhosis Mother     Inflammatory bowel disease Mother     Migraines Mother     Multiple sclerosis Mother     Hyperlipidemia Father         on meds    Anxiety disorder Sister     Celiac disease Sister     Migraines Sister     Cancer Maternal Grandmother     Depression Paternal Grandfather      "Mental illness Paternal Grandfather     Colon cancer Neg Hx     Colon polyps Neg Hx         MEDICATIONS AND ALLERGIES ON CHART AND REVIEWED.      Objective   Physical Exam  Vitals:    04/15/25 1028   BP: 112/73   BP Location: Left arm   Patient Position: Sitting   Cuff Size: Adult   Pulse: 86   Temp: 97.8 °F (36.6 °C)   TempSrc: Infrared   SpO2: 98%   Weight: 113 kg (248 lb 12.8 oz)   Height: 170 cm (66.93\")      Body mass index is 39.05 kg/m².    General:  Alert and oriented x3, no acute distress  HEENT:  Normocephalic, atraumatic.  Mucous membranes moist and pink.  Neck:  Supple.  No adenopathy, no thyromegaly.  Lungs:  Clear to auscultation bilaterally.  No rales or ronchi.  Normal respiratory effort.  Heart:  Regular rate and rhythm.  S1, S2 normal.  3/6 systolic murmur noted.  Breast:  No masses, nipple discharge or skin change bilaterally.  Nontender.  Inverted nipples noted bilaterally.  Breast self-exam/awareness reviewed with patient.  Abdomen:  Soft, nontender, nondistended, normal bowel sounds.  No organomegaly or masses.  Exam is limited by body habitus.  Extremeties:  No cyanosis, clubbing or edema.  Back:  No CVA tenderness  Skin:  Warm and dry.  No rashes or suspicious lesions noted.  Pelvic exam:  External/vulva:  Normal external female genitalia.  No lesions.  Bladder/urethra:  Meatus with normal appearance.  No prolapse.  No urethral or bladder masses palpable.  Vagina:  Normal rugae.  No lesions, no discharge.  Cervix:  No lesions.  Ectropion present.  No cervical motion tenderness.  Pap smear performed.  Uterus:  Average size, mid position, mobile, nontender.  Adnexa:  Nontender bilaterally without masses.  Examination limited by habitus.  Anus/perineum:  Normal appearance of skin.  No pigment changes, no lesions.  Procedures  N/a    Assessment & Plan        Diagnoses and all orders for this visit:    1. Encounter for annual routine gynecological examination (Primary)    2. Screening for cervical " cancer             Assessment & Plan  1. Routine women's health annual exam.  - Conducted Pap smear test to screen for cervical cancer along with screening tests for chlamydia, gonorrhea and trichomonas.  - Explained the procedure, including the use of a speculum and collection of cervical cells.  - Performed breast exam during the visit.    2. Breakthrough bleeding.  - Good results with change to 30mcg pill for control of BTB.    - Advised taking blank pills at the end of the first 3-month pack to induce a period and then again at the end of the next pack.  - If no breakthrough bleeding occurs after the second period, consider extending dosing to 6 months without a period.    Return in about 1 year (around 4/15/2026).    Patient or patient representative verbalized consent for the use of Ambient Listening during the visit with  Kasie Cao MD for chart documentation. 4/15/2025  11:05 CDT    MD EAGLE Mehta

## 2025-04-18 LAB
C TRACH RRNA CVX QL NAA+PROBE: NEGATIVE
CONV .: NORMAL
CYTOLOGIST CVX/VAG CYTO: NORMAL
CYTOLOGY CVX/VAG DOC CYTO: NORMAL
CYTOLOGY CVX/VAG DOC THIN PREP: NORMAL
DX ICD CODE: NORMAL
N GONORRHOEA RRNA CVX QL NAA+PROBE: NEGATIVE
OTHER STN SPEC: NORMAL
SERVICE CMNT-IMP: NORMAL
STAT OF ADQ CVX/VAG CYTO-IMP: NORMAL
T VAGINALIS RRNA SPEC QL NAA+PROBE: NEGATIVE

## 2025-04-21 DIAGNOSIS — G43.E19 INTRACTABLE CHRONIC MIGRAINE WITH AURA AND WITHOUT STATUS MIGRAINOSUS: ICD-10-CM

## 2025-04-21 RX ORDER — IBUPROFEN 800 MG/1
800 TABLET, FILM COATED ORAL EVERY 6 HOURS PRN
Qty: 90 TABLET | Refills: 3 | Status: SHIPPED | OUTPATIENT
Start: 2025-04-21

## 2025-04-22 ENCOUNTER — RESULTS FOLLOW-UP (OUTPATIENT)
Age: 20
End: 2025-04-22
Payer: COMMERCIAL

## 2025-04-22 NOTE — TELEPHONE ENCOUNTER
Called patient and relayed message regarding test results. Pt voiced understanding and had no questions or concerns at this time.